# Patient Record
Sex: FEMALE | Race: WHITE | NOT HISPANIC OR LATINO | Employment: OTHER | URBAN - METROPOLITAN AREA
[De-identification: names, ages, dates, MRNs, and addresses within clinical notes are randomized per-mention and may not be internally consistent; named-entity substitution may affect disease eponyms.]

---

## 2019-10-25 ENCOUNTER — OFFICE VISIT (OUTPATIENT)
Dept: FAMILY MEDICINE | Facility: CLINIC | Age: 74
End: 2019-10-25
Payer: COMMERCIAL

## 2019-10-25 VITALS
SYSTOLIC BLOOD PRESSURE: 120 MMHG | OXYGEN SATURATION: 98 % | HEART RATE: 72 BPM | TEMPERATURE: 97.8 F | HEIGHT: 60 IN | WEIGHT: 171.7 LBS | DIASTOLIC BLOOD PRESSURE: 82 MMHG | BODY MASS INDEX: 33.71 KG/M2

## 2019-10-25 DIAGNOSIS — R44.8 SENSATION OF BOTH HEAT AND COLD: ICD-10-CM

## 2019-10-25 DIAGNOSIS — T78.40XA ALLERGIC STATE, INITIAL ENCOUNTER: Primary | ICD-10-CM

## 2019-10-25 DIAGNOSIS — I10 ESSENTIAL HYPERTENSION: ICD-10-CM

## 2019-10-25 LAB
ANION GAP SERPL CALCULATED.3IONS-SCNC: 4 MMOL/L (ref 3–14)
BUN SERPL-MCNC: 16 MG/DL (ref 7–30)
CALCIUM SERPL-MCNC: 9 MG/DL (ref 8.5–10.1)
CHLORIDE SERPL-SCNC: 104 MMOL/L (ref 94–109)
CO2 SERPL-SCNC: 28 MMOL/L (ref 20–32)
CREAT SERPL-MCNC: 0.75 MG/DL (ref 0.52–1.04)
GFR SERPL CREATININE-BSD FRML MDRD: 77 ML/MIN/{1.73_M2}
GLUCOSE SERPL-MCNC: 93 MG/DL (ref 70–99)
POTASSIUM SERPL-SCNC: 4.3 MMOL/L (ref 3.4–5.3)
SODIUM SERPL-SCNC: 136 MMOL/L (ref 133–144)
TSH SERPL DL<=0.005 MIU/L-ACNC: 2.58 MU/L (ref 0.4–4)

## 2019-10-25 PROCEDURE — 84443 ASSAY THYROID STIM HORMONE: CPT | Performed by: NURSE PRACTITIONER

## 2019-10-25 PROCEDURE — 80048 BASIC METABOLIC PNL TOTAL CA: CPT | Performed by: NURSE PRACTITIONER

## 2019-10-25 PROCEDURE — 36415 COLL VENOUS BLD VENIPUNCTURE: CPT | Performed by: NURSE PRACTITIONER

## 2019-10-25 PROCEDURE — 99204 OFFICE O/P NEW MOD 45 MIN: CPT | Performed by: NURSE PRACTITIONER

## 2019-10-25 RX ORDER — AMLODIPINE BESYLATE 5 MG/1
5 TABLET ORAL
COMMUNITY
Start: 2019-08-31 | End: 2020-06-18

## 2019-10-25 RX ORDER — ASCORBIC ACID 500 MG
1-2 TABLET ORAL EVERY 24 HOURS
COMMUNITY
Start: 2005-04-20

## 2019-10-25 RX ORDER — ALBUTEROL SULFATE 90 UG/1
1-2 AEROSOL, METERED RESPIRATORY (INHALATION) EVERY 6 HOURS PRN
Qty: 18 G | Refills: 3 | Status: SHIPPED | OUTPATIENT
Start: 2019-10-25

## 2019-10-25 RX ORDER — TRIAMCINOLONE ACETONIDE 1 MG/G
CREAM TOPICAL
COMMUNITY
Start: 2014-08-14 | End: 2019-10-25

## 2019-10-25 RX ORDER — METOPROLOL SUCCINATE 25 MG/1
TABLET, EXTENDED RELEASE ORAL
COMMUNITY
Start: 2019-08-22 | End: 2020-03-02

## 2019-10-25 RX ORDER — OMEGA-3/DHA/EPA/FISH OIL 60 MG-90MG
CAPSULE ORAL EVERY 24 HOURS
COMMUNITY
Start: 2006-10-26 | End: 2019-10-25

## 2019-10-25 RX ORDER — METRONIDAZOLE 7.5 MG/G
GEL TOPICAL
COMMUNITY
Start: 2019-10-22 | End: 2020-06-18

## 2019-10-25 SDOH — HEALTH STABILITY: MENTAL HEALTH: HOW OFTEN DO YOU HAVE A DRINK CONTAINING ALCOHOL?: NEVER

## 2019-10-25 ASSESSMENT — MIFFLIN-ST. JEOR: SCORE: 1186.36

## 2019-10-25 NOTE — PROGRESS NOTES
"Subjective     Violet Robins is a 74 year old female who presents to clinic today for the following health issues: Complains of intermittent shortness of breath and wheezing, mainly when she inside of her house and sometimes when she visiting her brother who lives in old apartment, possibly dust, mold exposure. The patient reports history of allergies. The patient also complains of fatigue and states she either feels hot or cold sometimes, worse at night time.     HPI   Wheezing       Duration: 2 years- intermittent     Description (location/character/radiation): Patient has allergies and has been wheezing, she is unsure if this is due to her allergies or is wondering if she could have asthma.     Intensity:  mild    Accompanying signs and symptoms: She is going to [place] and is concerned about her wheezing.     History (similar episodes/previous evaluation): None    Precipitating or alleviating factors: None    Therapies tried and outcome: Mucinex- has been helpful      Reviewed and updated as needed this visit by Provider  Tobacco  Allergies  Meds  Problems  Med Hx  Surg Hx  Fam Hx         Review of Systems   ROS COMP: Constitutional, HEENT, cardiovascular, pulmonary, gi and gu systems are negative, except as otherwise noted.      Objective    /82 (BP Location: Right arm, Patient Position: Sitting, Cuff Size: Adult Regular)   Pulse 72   Temp 97.8  F (36.6  C) (Tympanic)   Ht 1.518 m (4' 11.75\")   Wt 77.9 kg (171 lb 11.2 oz)   SpO2 98%   BMI 33.81 kg/m    Body mass index is 33.81 kg/m .  Physical Exam   GENERAL: healthy, alert and no distress  EYES: Eyes grossly normal to inspection, PERRL and conjunctivae and sclerae normal  RESP: lungs clear to auscultation - no rales, rhonchi or wheezes  CV: regular rate and rhythm, normal S1 S2, no S3 or S4, no murmur, click or rub, no peripheral edema and peripheral pulses strong  NEURO: Normal strength and tone, mentation intact and speech " normal  PSYCH: mentation appears normal, affect normal/bright    Diagnostic Test Results:  Labs reviewed in Epic        Assessment & Plan     1. Allergic state, initial encounter  -seasonal allergies  -will consider PFT's if no improvement   - albuterol (PROAIR HFA/PROVENTIL HFA/VENTOLIN HFA) 108 (90 Base) MCG/ACT inhaler; Inhale 1-2 puffs into the lungs every 6 hours as needed for shortness of breath / dyspnea or wheezing  Dispense: 18 g; Refill: 3    2. Essential hypertension  -well controlled   - Basic metabolic panel    3. Sensation of both heat and cold  -thyroid function normal   - TSH with free T4 reflex     See Patient Instructions    Return in about 2 weeks (around 11/8/2019), or if symptoms worsen or fail to improve.    BERTA Troncoso [facility]

## 2019-10-25 NOTE — PATIENT INSTRUCTIONS
Albuterol 1-2 puffs every 6 hrs as needed for wheezing and shortness of breath     Recommend to check thyroid function    Monitor BP at home.

## 2019-10-25 NOTE — LETTER
[facility]  [address]  [facility] [address]  Phone: [phone]      10/25/2019     Violet Robins  [address]    [place] [address]      Dear Violet:    Thank you for allowing me to participate in your care. Your recent test results were reviewed and listed below.      Thyroid function, kidney function, electrolytes normal.     BERTA Troncoso CNP     Results for orders placed or performed in visit on 10/25/19   TSH with free T4 reflex   Result Value Ref Range    TSH 2.58 0.40 - 4.00 mU/L   Basic metabolic panel   Result Value Ref Range    Sodium 136 133 - 144 mmol/L    Potassium 4.3 3.4 - 5.3 mmol/L    Chloride 104 94 - 109 mmol/L    Carbon Dioxide 28 20 - 32 mmol/L    Anion Gap 4 3 - 14 mmol/L    Glucose 93 70 - 99 mg/dL    Urea Nitrogen 16 7 - 30 mg/dL    Creatinine 0.75 0.52 - 1.04 mg/dL    GFR Estimate 77 >60 mL/min/[1.73_m2]    GFR Estimate If Black 89 >60 mL/min/[1.73_m2]    Calcium 9.0 8.5 - 10.1 mg/dL         Thank you for choosing [facility]. As a result, please continue with the treatment plan discussed in the office. Return as discussed or sooner if symptoms worsen or fail to improve.     If you have any further questions or concerns, please do not hesitate to contact us.      Sincerely,  Komal Lynch

## 2020-03-02 ENCOUNTER — OFFICE VISIT (OUTPATIENT)
Dept: FAMILY MEDICINE | Facility: CLINIC | Age: 75
End: 2020-03-02
Payer: COMMERCIAL

## 2020-03-02 VITALS
DIASTOLIC BLOOD PRESSURE: 90 MMHG | RESPIRATION RATE: 18 BRPM | WEIGHT: 171 LBS | BODY MASS INDEX: 33.57 KG/M2 | HEART RATE: 75 BPM | TEMPERATURE: 98 F | OXYGEN SATURATION: 98 % | HEIGHT: 60 IN | SYSTOLIC BLOOD PRESSURE: 150 MMHG

## 2020-03-02 DIAGNOSIS — R06.2 WHEEZING: Primary | ICD-10-CM

## 2020-03-02 DIAGNOSIS — J45.909 ALLERGIC ASTHMA WITH STATED CAUSE: ICD-10-CM

## 2020-03-02 DIAGNOSIS — I10 BENIGN ESSENTIAL HYPERTENSION: ICD-10-CM

## 2020-03-02 PROCEDURE — 99214 OFFICE O/P EST MOD 30 MIN: CPT | Performed by: NURSE PRACTITIONER

## 2020-03-02 RX ORDER — LOSARTAN POTASSIUM 50 MG/1
25 TABLET ORAL DAILY
Qty: 30 TABLET | Refills: 1 | Status: SHIPPED | OUTPATIENT
Start: 2020-03-02 | End: 2020-03-02

## 2020-03-02 RX ORDER — LOSARTAN POTASSIUM 25 MG/1
25 TABLET ORAL DAILY
Qty: 30 TABLET | Refills: 1 | Status: SHIPPED | OUTPATIENT
Start: 2020-03-02 | End: 2020-06-18

## 2020-03-02 RX ORDER — MONTELUKAST SODIUM 10 MG/1
10 TABLET ORAL AT BEDTIME
Qty: 30 TABLET | Refills: 3 | Status: SHIPPED | OUTPATIENT
Start: 2020-03-02 | End: 2020-06-18

## 2020-03-02 ASSESSMENT — MIFFLIN-ST. JEOR: SCORE: 1183.18

## 2020-03-02 NOTE — PROGRESS NOTES
"Subjective     Violet Robins is a 74 year old female who presents to clinic today for the following health issues: elevated BP and wheezing. Denies shortness of breath, chest pain, palpitations, states sometimes when she takes Metoprolol it makes her feel dizzy. The patient reports she used to take Lisinopril for hypertension and it worked very well, but unfortunately she had to stop it due to side effects: dry cough. Her prior provider changed Lisinopril to Metoprolol, stated at 25 mg daily and then increase to 50 mg daily, but patient states blood pressure still was high. She is currently taking 50 mg of Metoprolol daily and 5 mg of Norvasc daily.   The patient reports she gets wheezy sometimes when exposed to certain triggers: cold air, dust, mold, dry air. The patient states wheezing is rare, she is using Albuterol as needed with good relief.     HPI   Wheezing       Duration: off and on for awhile, started to get worse last week     Description  Wheezing, congestion at night time, cough in the morning     Severity: mild    Accompanying signs and symptoms: Was seen on 10/25/19 for similar symptoms.     History (predisposing factors):  none    Precipitating or alleviating factors: None    Therapies tried and outcome:  Albuterol inhaler, humidifier       Reviewed and updated as needed this visit by Provider         Review of Systems   ROS COMP: Constitutional, HEENT, cardiovascular, pulmonary, gi and gu systems are negative, except as otherwise noted.      Objective    BP (!) 150/90 (BP Location: Right arm, Patient Position: Sitting)   Pulse 75   Temp 98  F (36.7  C) (Tympanic)   Resp 18   Ht 1.518 m (4' 11.75\")   Wt 77.6 kg (171 lb)   SpO2 98%   BMI 33.68 kg/m    Body mass index is 33.68 kg/m .  Physical Exam   GENERAL: healthy, alert and no distress  RESP: lungs clear to auscultation - no rales, rhonchi or wheezes  CV: regular rate and rhythm, normal S1 S2, no S3 or S4, no murmur, click or rub, no " peripheral edema and peripheral pulses strong  NEURO: Normal strength and tone, mentation intact and speech normal  PSYCH: mentation appears normal, affect normal/bright    Diagnostic Test Results:  Labs reviewed in Epic        Assessment & Plan     1. Wheezing  -due to allergies   -continue Albuterol as needed  -started Singulair  - montelukast (SINGULAIR) 10 MG tablet; Take 1 tablet (10 mg) by mouth At Bedtime  Dispense: 30 tablet; Refill: 3    2. Allergic asthma with stated cause    - montelukast (SINGULAIR) 10 MG tablet; Take 1 tablet (10 mg) by mouth At Bedtime  Dispense: 30 tablet; Refill: 3    3. Benign essential hypertension  -advised patient that Metoprolol not an ideal medication to treat hypertension, unless if patient has other cardiovascular problems, since it is also causing side effects (lightheadedness) recommended her to taper off Metoprolol and start Cozaar and continue Norvasc 5 mg daily  -follow up with RN in 2 weeks for BP recheck.   - losartan (COZAAR) 50 MG tablet; Take 0.5 tablets (25 mg) by mouth daily  Dispense: 30 tablet; Refill: 1         See Patient Instructions    Return in about 2 weeks (around 3/16/2020) for BP Recheck.    BERTA Troncoso [facility]

## 2020-03-02 NOTE — PATIENT INSTRUCTIONS
Continue Norvasc 5 mg daily     Will taper off Metoprolol, this is weak medication to treat just high blood pressure, not first choice medication for treatment of hypertension    Reduce to 25 mg daily for 1 week and then stop    Start Cozaar 25 mg daily     For allergies start Singulair 10 mg daily    Continue Albuterol as needed      Schedule nurse appointment in 2 weeks to recheck BP

## 2020-03-09 PROBLEM — J45.909: Status: ACTIVE | Noted: 2020-03-09

## 2020-03-16 ENCOUNTER — TELEPHONE (OUTPATIENT)
Dept: FAMILY MEDICINE | Facility: CLINIC | Age: 75
End: 2020-03-16

## 2020-03-16 ENCOUNTER — ALLIED HEALTH/NURSE VISIT (OUTPATIENT)
Dept: FAMILY MEDICINE | Facility: CLINIC | Age: 75
End: 2020-03-16
Payer: COMMERCIAL

## 2020-03-16 VITALS — DIASTOLIC BLOOD PRESSURE: 82 MMHG | SYSTOLIC BLOOD PRESSURE: 126 MMHG | HEART RATE: 72 BPM

## 2020-03-16 DIAGNOSIS — I10 ESSENTIAL HYPERTENSION: Primary | ICD-10-CM

## 2020-03-16 PROCEDURE — 99207 ZZC NO CHARGE NURSE ONLY: CPT

## 2020-03-16 NOTE — NURSING NOTE
Violet Robins is a 74 year old year old patient who comes in today for a Blood Pressure check because of new medication and ongoing blood pressure monitoring.    Pt was seen 3/2/20 by Komal Lynch and Cozaar, 25 mg, was added to regimen.    Vital Signs as repeated by RN:   /82, pulse of 72.    Patient is taking medication as prescribed  Patient is tolerating medications well.  Patient is monitoring Blood Pressure at home.  Average readings are 120's/80's  Current complaints: none  Disposition:  Routed to provider in a telephone note.  Pt is now at goal.  SALINA Dye RN

## 2020-05-21 ENCOUNTER — TELEPHONE (OUTPATIENT)
Dept: FAMILY MEDICINE | Facility: CLINIC | Age: 75
End: 2020-05-21

## 2020-05-21 RX ORDER — METRONIDAZOLE 7.5 MG/G
GEL TOPICAL
OUTPATIENT
Start: 2020-05-21

## 2020-05-21 NOTE — TELEPHONE ENCOUNTER
"Requested Prescriptions   Pending Prescriptions Disp Refills     metroNIDAZOLE (METROGEL) 0.75 % external gel         Topical Acne Medications Protocol Passed - 5/21/2020  1:41 PM        Passed - Patient is 12 years of age or older        Passed - Recent (12 mo) or future (30 days) visit within the authorizing provider's specialty     Patient has had an office visit with the authorizing provider or a provider within the authorizing providers department within the previous 12 mos or has a future within next 30 days. See \"Patient Info\" tab in inbasket, or \"Choose Columns\" in Meds & Orders section of the refill encounter.              Passed - Medication is active on med list           Routing refill request to provider for review/approval because:  Medication is reported/historical        "

## 2020-06-18 ENCOUNTER — OFFICE VISIT (OUTPATIENT)
Dept: FAMILY MEDICINE | Facility: CLINIC | Age: 75
End: 2020-06-18
Payer: COMMERCIAL

## 2020-06-18 VITALS
DIASTOLIC BLOOD PRESSURE: 80 MMHG | SYSTOLIC BLOOD PRESSURE: 130 MMHG | OXYGEN SATURATION: 95 % | HEIGHT: 60 IN | BODY MASS INDEX: 32.61 KG/M2 | WEIGHT: 166.1 LBS | HEART RATE: 80 BPM | RESPIRATION RATE: 16 BRPM | TEMPERATURE: 97.8 F

## 2020-06-18 DIAGNOSIS — L71.9 ROSACEA: ICD-10-CM

## 2020-06-18 DIAGNOSIS — I10 BENIGN ESSENTIAL HYPERTENSION: Primary | ICD-10-CM

## 2020-06-18 DIAGNOSIS — Z85.3 PERSONAL HISTORY OF MALIGNANT NEOPLASM OF BREAST: ICD-10-CM

## 2020-06-18 DIAGNOSIS — J45.20 MILD INTERMITTENT EXTRINSIC ASTHMA WITHOUT COMPLICATION: ICD-10-CM

## 2020-06-18 DIAGNOSIS — Z12.31 ENCOUNTER FOR SCREENING MAMMOGRAM FOR BREAST CANCER: ICD-10-CM

## 2020-06-18 PROCEDURE — 99214 OFFICE O/P EST MOD 30 MIN: CPT | Performed by: NURSE PRACTITIONER

## 2020-06-18 RX ORDER — METRONIDAZOLE 7.5 MG/G
GEL TOPICAL 2 TIMES DAILY
Qty: 45 G | Refills: 2 | Status: SHIPPED | OUTPATIENT
Start: 2020-06-18 | End: 2020-11-12

## 2020-06-18 RX ORDER — LOSARTAN POTASSIUM 25 MG/1
25 TABLET ORAL DAILY
Qty: 90 TABLET | Refills: 3 | Status: SHIPPED | OUTPATIENT
Start: 2020-06-18 | End: 2021-06-04

## 2020-06-18 RX ORDER — AMLODIPINE BESYLATE 5 MG/1
5 TABLET ORAL DAILY
Qty: 90 TABLET | Refills: 3 | Status: SHIPPED | OUTPATIENT
Start: 2020-06-18 | End: 2021-08-19

## 2020-06-18 RX ORDER — MONTELUKAST SODIUM 10 MG/1
10 TABLET ORAL AT BEDTIME
Qty: 90 TABLET | Refills: 3 | Status: SHIPPED | OUTPATIENT
Start: 2020-06-18 | End: 2021-06-23

## 2020-06-18 ASSESSMENT — MIFFLIN-ST. JEOR: SCORE: 1155.95

## 2020-06-18 NOTE — PROGRESS NOTES
"Subjective     Violet Robins is a 75 year old female who presents to clinic today for the following health issues:    Chief Complaint   Patient presents with     Refill Request     Losartan, Singulair, Amlodipine, Metrogel for Roscasea        HPI   Hypertension Follow-up      Do you check your blood pressure regularly outside of the clinic? Yes at home, results are 124/78     Are you following a low salt diet? No    Are your blood pressures ever more than 140 on the top number (systolic) OR more   than 90 on the bottom number (diastolic), for example 140/90? No  Asthma Follow-Up    Was ACT completed today?    Yes    ACT Total Scores 6/18/2020   ACT TOTAL SCORE (Goal Greater than or Equal to 20) 25   In the past 12 months, how many times did you visit the emergency room for your asthma without being admitted to the hospital? 0   In the past 12 months, how many times were you hospitalized overnight because of your asthma? 0         How many days per week do you miss taking your asthma controller medication?  I do not have an asthma controller medication    Please describe any recent triggers for your asthma: pollens    Have you had any Emergency Room Visits, Urgent Care Visits, or Hospital Admissions since your last office visit?  No        Reviewed and updated as needed this visit by Provider         Review of Systems   Constitutional, HEENT, cardiovascular, pulmonary, gi and gu systems are negative, except as otherwise noted.      Objective    /80 (BP Location: Right arm, Patient Position: Sitting, Cuff Size: Adult Regular)   Pulse 80   Temp 97.8  F (36.6  C) (Tympanic)   Resp 16   Ht 1.518 m (4' 11.75\")   Wt 75.3 kg (166 lb 1.6 oz)   SpO2 95%   BMI 32.71 kg/m    Body mass index is 32.71 kg/m .  Physical Exam   GENERAL: healthy, alert and no distress  EYES: Eyes grossly normal to inspection, PERRL and conjunctivae and sclerae normal  RESP: lungs clear to auscultation - no rales, rhonchi or " wheezes  CV: regular rate and rhythm, normal S1 S2, no S3 or S4, no murmur, click or rub, no peripheral edema and peripheral pulses strong  SKIN: no suspicious lesions or rashes  PSYCH: mentation appears normal, affect normal/bright    Diagnostic Test Results:  Labs reviewed in Epic        Assessment & Plan     1. Benign essential hypertension  -well controlled   - amLODIPine (NORVASC) 5 MG tablet; Take 1 tablet (5 mg) by mouth daily  Dispense: 90 tablet; Refill: 3  - losartan (COZAAR) 25 MG tablet; Take 1 tablet (25 mg) by mouth daily  Dispense: 90 tablet; Refill: 3    2. Mild intermittent extrinsic asthma without complication  -well controlled   - montelukast (SINGULAIR) 10 MG tablet; Take 1 tablet (10 mg) by mouth At Bedtime  Dispense: 90 tablet; Refill: 3    3. Rosacea    - metroNIDAZOLE (METROGEL) 0.75 % external gel; Apply topically 2 times daily  Dispense: 45 g; Refill: 2    4. Personal history of malignant neoplasm of breast  -history of breast cancer,  -due for screening mammogram    5. Encounter for screening mammogram for breast cancer  -would like to have 3 D mammogram   - MA Screen Bilateral w/Vlad; Future     See Patient Instructions    Return in about 1 year (around 6/18/2021) for Routine Visit.    BERTA Troncoso [facility]

## 2020-06-19 ENCOUNTER — HOSPITAL ENCOUNTER (OUTPATIENT)
Dept: MAMMOGRAPHY | Facility: CLINIC | Age: 75
Discharge: HOME | End: 2020-06-19
Attending: NURSE PRACTITIONER | Admitting: NURSE PRACTITIONER
Payer: MEDICARE

## 2020-06-19 DIAGNOSIS — Z12.31 ENCOUNTER FOR SCREENING MAMMOGRAM FOR BREAST CANCER: ICD-10-CM

## 2020-06-19 PROCEDURE — 77067 SCR MAMMO BI INCL CAD: CPT

## 2020-06-19 ASSESSMENT — ASTHMA QUESTIONNAIRES: ACT_TOTALSCORE: 25

## 2020-11-12 DIAGNOSIS — L71.9 ROSACEA: ICD-10-CM

## 2020-11-12 RX ORDER — METRONIDAZOLE 7.5 MG/G
GEL TOPICAL
Qty: 45 G | Refills: 1 | Status: SHIPPED | OUTPATIENT
Start: 2020-11-12 | End: 2021-03-16

## 2020-11-12 NOTE — TELEPHONE ENCOUNTER
"Requested Prescriptions   Pending Prescriptions Disp Refills     metroNIDAZOLE (METROGEL) 0.75 % external gel [Pharmacy Med Name: metroNIDAZOLE External Gel 0.75 %] 45 g 0     Sig: APPLY TO AFFECTED AREAS TWICE DAILY       Topical Acne Medications Protocol Passed - 11/12/2020  1:47 PM        Passed - Patient is 12 years of age or older        Passed - Recent (12 mo) or future (30 days) visit within the authorizing provider's specialty     Patient has had an office visit with the authorizing provider or a provider within the authorizing providers department within the previous 12 mos or has a future within next 30 days. See \"Patient Info\" tab in inbasket, or \"Choose Columns\" in Meds & Orders section of the refill encounter.              Passed - Medication is active on med list             "

## 2021-03-15 DIAGNOSIS — L71.9 ROSACEA: ICD-10-CM

## 2021-03-15 NOTE — TELEPHONE ENCOUNTER
"Requested Prescriptions   Pending Prescriptions Disp Refills     metroNIDAZOLE (METROGEL) 0.75 % external gel [Pharmacy Med Name: metroNIDAZOLE External Gel 0.75 %] 45 g 0     Sig: APPLY TO AFFECTED AREA(s) TWICE A DAY       Topical Acne Medications Protocol Passed - 3/15/2021 10:36 AM        Passed - Patient is 12 years of age or older        Passed - Recent (12 mo) or future (30 days) visit within the authorizing provider's specialty     Patient has had an office visit with the authorizing provider or a provider within the authorizing providers department within the previous 12 mos or has a future within next 30 days. See \"Patient Info\" tab in inbasket, or \"Choose Columns\" in Meds & Orders section of the refill encounter.              Passed - Medication is active on med list             "

## 2021-03-16 RX ORDER — METRONIDAZOLE 7.5 MG/G
GEL TOPICAL
Qty: 45 G | Refills: 0 | Status: SHIPPED | OUTPATIENT
Start: 2021-03-16 | End: 2021-05-06

## 2021-05-04 DIAGNOSIS — L71.9 ROSACEA: ICD-10-CM

## 2021-05-06 RX ORDER — METRONIDAZOLE 7.5 MG/G
GEL TOPICAL
Qty: 45 G | Refills: 0 | Status: SHIPPED | OUTPATIENT
Start: 2021-05-06 | End: 2021-07-15

## 2021-06-04 DIAGNOSIS — I10 BENIGN ESSENTIAL HYPERTENSION: ICD-10-CM

## 2021-06-04 RX ORDER — LOSARTAN POTASSIUM 25 MG/1
TABLET ORAL
Qty: 90 TABLET | Refills: 0 | Status: SHIPPED | OUTPATIENT
Start: 2021-06-04 | End: 2021-06-23

## 2021-06-04 NOTE — TELEPHONE ENCOUNTER
Creatinine   Date Value Ref Range Status   10/25/2019 0.75 0.52 - 1.04 mg/dL Final     Potassium   Date Value Ref Range Status   10/25/2019 4.3 3.4 - 5.3 mmol/L Final

## 2021-06-21 DIAGNOSIS — J45.20 MILD INTERMITTENT EXTRINSIC ASTHMA WITHOUT COMPLICATION: ICD-10-CM

## 2021-06-21 DIAGNOSIS — I10 BENIGN ESSENTIAL HYPERTENSION: ICD-10-CM

## 2021-06-23 RX ORDER — LOSARTAN POTASSIUM 25 MG/1
TABLET ORAL
Qty: 90 TABLET | Refills: 0 | Status: SHIPPED | OUTPATIENT
Start: 2021-06-23 | End: 2021-08-19

## 2021-06-23 RX ORDER — MONTELUKAST SODIUM 10 MG/1
TABLET ORAL
Qty: 90 TABLET | Refills: 0 | Status: SHIPPED | OUTPATIENT
Start: 2021-06-23 | End: 2021-09-17

## 2021-06-23 NOTE — TELEPHONE ENCOUNTER
"Requested Prescriptions   Pending Prescriptions Disp Refills     montelukast (SINGULAIR) 10 MG tablet [Pharmacy Med Name: Montelukast Sodium Oral Tablet 10 MG] 90 tablet 0     Sig: TAKE ONE TABLET BY MOUTH AT BEDTIME       Leukotriene Inhibitors Protocol Failed - 6/21/2021  9:10 AM        Failed - Asthma control assessment score within normal limits in last 6 months     Please review ACT score.           Failed - Recent (6 mo) or future (30 days) visit within the authorizing provider's specialty     Patient had office visit in the last 6 months or has a visit in the next 30 days with authorizing provider or within the authorizing provider's specialty.  See \"Patient Info\" tab in inbasket, or \"Choose Columns\" in Meds & Orders section of the refill encounter.            Passed - Patient is age 12 or older     If patient is under 16, ok to refill using age based dosing.           Passed - Medication is active on med list           losartan (COZAAR) 25 MG tablet [Pharmacy Med Name: Losartan Potassium Oral Tablet 25 MG] 90 tablet 0     Sig: TAKE ONE TABLET BY MOUTH ONE TIME DAILY       Angiotensin-II Receptors Failed - 6/21/2021  9:10 AM        Failed - Last blood pressure under 140/90 in past 12 months     BP Readings from Last 3 Encounters:   06/18/20 130/80   03/16/20 126/82   03/02/20 (!) 150/90                 Failed - Recent (12 mo) or future (30 days) visit within the authorizing provider's specialty     Patient has had an office visit with the authorizing provider or a provider within the authorizing providers department within the previous 12 mos or has a future within next 30 days. See \"Patient Info\" tab in inbasket, or \"Choose Columns\" in Meds & Orders section of the refill encounter.              Failed - Normal serum creatinine on file in past 12 months     Recent Labs   Lab Test 10/25/19  1001   CR 0.75       Ok to refill medication if creatinine is low          Failed - Normal serum potassium on file in " past 12 months     Recent Labs   Lab Test 10/25/19  1001   POTASSIUM 4.3                    Passed - Medication is active on med list        Passed - Patient is age 18 or older        Passed - No active pregnancy on record        Passed - No positive pregnancy test in past 12 months

## 2021-07-10 DIAGNOSIS — L71.9 ROSACEA: ICD-10-CM

## 2021-07-15 RX ORDER — METRONIDAZOLE 7.5 MG/G
GEL TOPICAL
Qty: 45 G | Refills: 0 | Status: SHIPPED | OUTPATIENT
Start: 2021-07-15 | End: 2021-09-15

## 2021-07-19 ENCOUNTER — HOSPITAL ENCOUNTER (EMERGENCY)
Facility: CLINIC | Age: 76
Discharge: HOME | End: 2021-07-19
Attending: FAMILY MEDICINE | Admitting: FAMILY MEDICINE
Payer: COMMERCIAL

## 2021-07-19 VITALS
RESPIRATION RATE: 16 BRPM | HEART RATE: 71 BPM | WEIGHT: 170 LBS | BODY MASS INDEX: 33.48 KG/M2 | SYSTOLIC BLOOD PRESSURE: 148 MMHG | OXYGEN SATURATION: 97 % | TEMPERATURE: 98 F | DIASTOLIC BLOOD PRESSURE: 79 MMHG

## 2021-07-19 DIAGNOSIS — L03.113 CELLULITIS OF RIGHT UPPER EXTREMITY: ICD-10-CM

## 2021-07-19 DIAGNOSIS — T78.40XA ALLERGIC REACTION, INITIAL ENCOUNTER: ICD-10-CM

## 2021-07-19 PROCEDURE — 99282 EMERGENCY DEPT VISIT SF MDM: CPT | Performed by: FAMILY MEDICINE

## 2021-07-19 PROCEDURE — 99284 EMERGENCY DEPT VISIT MOD MDM: CPT | Performed by: FAMILY MEDICINE

## 2021-07-19 RX ORDER — CEPHALEXIN 500 MG/1
500 CAPSULE ORAL 4 TIMES DAILY
Qty: 40 CAPSULE | Refills: 0 | Status: SHIPPED | OUTPATIENT
Start: 2021-07-19 | End: 2021-07-29

## 2021-07-19 RX ORDER — PREDNISONE 20 MG/1
TABLET ORAL
Qty: 10 TABLET | Refills: 0 | Status: SHIPPED | OUTPATIENT
Start: 2021-07-19 | End: 2021-12-15

## 2021-07-19 NOTE — ED TRIAGE NOTES
pt bite by unknown insect or bee yesterday. Pt's right arm is red up into forearm. Hx of cellulitis from a bee sting with similar symptoms.

## 2021-07-19 NOTE — DISCHARGE INSTRUCTIONS
Keflex 500 mg p.o. 4 times daily x10 days.  Prednisone 40 mg p.o. daily x5 days.  Close eye on the extent of the redness and if it is exceeding the margins marked today in the emergency department by more than 1 cm over the next 48-72 hours please return.

## 2021-07-19 NOTE — ED PROVIDER NOTES
History     Chief Complaint   Patient presents with     Insect Bite     pt bite by unknown insect or bee yesterday. Pt's right arm is red up into forearm. Hx of cellulitis from a bee sting with similar symptoms.      HPI  Violet Robins is a 76 year old female, past medical history is significant for asthma, hypertension, breast cancer, hypertension, resents to the emergency department with concerns of an insect sting to her right thumb area yesterday morning or early afternoon.  Since that time the area has become increasingly red and she is now getting red streaks up her forearm.  The patient states that about 5 or 6 years ago she had a similar reaction that ultimately wound up requiring IV antibiotics and she does not want that to happen again.  She has used some topical Benadryl cream which has not helped very much.  No other medications and denied any oral Benadryl.  She notes no difficulty swallowing or breathing, notes no disseminated rash of any kind no swelling of digits other than the one affected by the sting.      Allergies:  Allergies   Allergen Reactions     Bee Venom Hives and Swelling     Oxycontin [Kdc:Ci Pigment Blue 63+Oxycodone] Nausea and Vomiting     Codeine      PN: LW Reaction: nausea and vomiting     Latex Other (See Comments)     PN: Converted from LW Latex Sensitivity Flag     Lisinopril      cough     Tramadol      PN: LW Reaction: nausea and vomiting       Problem List:    Patient Active Problem List    Diagnosis Date Noted     Allergic asthma with stated cause 03/09/2020     Priority: Medium     CARDIOVASCULAR SCREENING; LDL GOAL LESS THAN 130 01/16/2014     Priority: Medium     DDD (degenerative disc disease), cervical 01/08/2014     Priority: Medium     Routine general medical examination at a health care facility 01/08/2014     Priority: Medium     [facility]       [place] in situ breast 08/17/2011     Priority: Medium     Essential hypertension 06/07/2003     Priority: Medium      lisinopril 10 mg daily  ; Hypertension     Last Assessment & Plan:   Patient has hypertension currently stable on lisinopril 10 mg. Blood pressure today is 136/82.   No side effects noted from medications.  She denies chest pain or shortness of breath.  Patient denies headaches or neurological symptoms. She is not experiencing edema, orthopnea, or dyspnea on exertion.          Past Medical History:    No past medical history on file.    Past Surgical History:    No past surgical history on file.    Family History:    Family History   Problem Relation Age of Onset     Coronary Artery Disease Father        Social History:  Marital Status:   [2]  Social History     Tobacco Use     Smoking status: Never Smoker     Smokeless tobacco: Never Used   Substance Use Topics     Alcohol use: Not Currently     Drug use: Never        Medications:    cephALEXin (KEFLEX) 500 MG capsule  predniSONE (DELTASONE) 20 MG tablet  albuterol (PROAIR HFA/PROVENTIL HFA/VENTOLIN HFA) 108 (90 Base) MCG/ACT inhaler  amLODIPine (NORVASC) 5 MG tablet  ascorbic acid 500 MG TABS  aspirin (ASA) 81 MG tablet  Calcium Carbonate-Vitamin D (CALCIUM + D PO)  Cholecalciferol (VITAMIN D) 1000 UNITS capsule  FISH OIL-KRILL OIL PO  Glucosamine-Chondroit-Vit C-Mn (GLUCOSAMINE 1500 COMPLEX) CAPS  losartan (COZAAR) 25 MG tablet  metroNIDAZOLE (METROGEL) 0.75 % external gel  montelukast (SINGULAIR) 10 MG tablet  Multiple Vitamin (MULTIVITAMIN OR)  VITAMIN E          Review of Systems   All other systems reviewed and are negative.      Physical Exam   BP: (!) 148/79  Pulse: 71  Temp: 98  F (36.7  C)  Resp: 18  Weight: 77.1 kg (170 lb)  SpO2: 97 %      Physical Exam  Vitals and nursing note reviewed.   Constitutional:       Appearance: Normal appearance.   Musculoskeletal:      Comments: There is erythema, warmth, subtle swelling involving the right hand first digit dorsally which extends into the distal one third forearm.  There is no epitrochlear  lymphadenopathy.   Skin:     General: Skin is warm and dry.      Capillary Refill: Capillary refill takes less than 2 seconds.      Findings: Rash present.   Neurological:      Mental Status: She is alert.         ED Course        Procedures          3:42 PM  Margins are marked with the cellulitis by the patient's nurse before she is dispositioned to home today with oral Keflex and oral prednisone.  Reviewed expectations, if the area of cellulitis exceeds the currently marked margin by more than 1 cm over the next 48 to 72 hours she is to return to the emergency department for repeat evaluation.    Critical Care time:  none               No results found for this or any previous visit (from the past 24 hour(s)).    Medications - No data to display    Assessments & Plan (with Medical Decision Making)   Assessments and plan with medical decision making at the time stamp above.    Disclaimer: This note consists of symbols derived from keyboarding, dictation and/or voice recognition software. As a result, there may be errors in the script that have gone undetected. Please consider this when interpreting information found in this chart.      I have reviewed the nursing notes.    I have reviewed the findings, diagnosis, plan and need for follow up with the patient.          New Prescriptions    CEPHALEXIN (KEFLEX) 500 MG CAPSULE    Take 1 capsule (500 mg) by mouth 4 times daily for 10 days    PREDNISONE (DELTASONE) 20 MG TABLET    Take two tablets (= 40mg) each day for 5 (five) days       Final diagnoses:   Cellulitis of right upper extremity   Allergic reaction, initial encounter       7/19/2021   [facility] EMERGENCY DEPT     Moiz Rivera MD  07/19/21 [id number]

## 2021-07-19 NOTE — ED NOTES
Patient states she was bit on her right hand by what she thinks was a bee or wasp at 9 am yesterday morning. The hand is swollen and the redness is traveling up the arm and down the hand. Patient states the area is very itchy and the arm is a little sore. Patient states she used benadryl cream for the itching with no relief. Patient states she had a reaction to a bee sting once before around 5-6 years ago. The doctor at that time diagnosed her with cellulitis.

## 2021-08-09 ENCOUNTER — HOSPITAL ENCOUNTER (OUTPATIENT)
Dept: MAMMOGRAPHY | Facility: CLINIC | Age: 76
Discharge: HOME | End: 2021-08-09
Attending: NURSE PRACTITIONER | Admitting: NURSE PRACTITIONER
Payer: MEDICARE

## 2021-08-09 DIAGNOSIS — Z12.31 VISIT FOR SCREENING MAMMOGRAM: ICD-10-CM

## 2021-08-09 PROCEDURE — 77063 BREAST TOMOSYNTHESIS BI: CPT

## 2021-08-16 DIAGNOSIS — I10 BENIGN ESSENTIAL HYPERTENSION: ICD-10-CM

## 2021-08-18 NOTE — TELEPHONE ENCOUNTER
"Requested Prescriptions   Pending Prescriptions Disp Refills     amLODIPine (NORVASC) 5 MG tablet [Pharmacy Med Name: amLODIPine Besylate Oral Tablet 5 MG] 90 tablet 0     Sig: TAKE ONE TABLET BY MOUTH ONE TIME DAILY       Calcium Channel Blockers Protocol  Failed - 8/16/2021  9:01 AM        Failed - Blood pressure under 140/90 in past 12 months     BP Readings from Last 3 Encounters:   07/19/21 (!) 148/79   06/18/20 130/80   03/16/20 126/82                 Failed - Recent (12 mo) or future (30 days) visit within the authorizing provider's specialty     Patient has had an office visit with the authorizing provider or a provider within the authorizing providers department within the previous 12 mos or has a future within next 30 days. See \"Patient Info\" tab in inbasket, or \"Choose Columns\" in Meds & Orders section of the refill encounter.              Failed - Normal serum creatinine on file in past 12 months     Recent Labs   Lab Test 10/25/19  1001   CR 0.75       Ok to refill medication if creatinine is low          Passed - Medication is active on med list        Passed - Patient is age 18 or older        Passed - No active pregnancy on record        Passed - No positive pregnancy test in past 12 months           losartan (COZAAR) 25 MG tablet [Pharmacy Med Name: Losartan Potassium Oral Tablet 25 MG] 90 tablet 0     Sig: TAKE ONE TABLET BY MOUTH ONE TIME DAILY       Angiotensin-II Receptors Failed - 8/16/2021  9:01 AM        Failed - Last blood pressure under 140/90 in past 12 months     BP Readings from Last 3 Encounters:   07/19/21 (!) 148/79   06/18/20 130/80   03/16/20 126/82                 Failed - Recent (12 mo) or future (30 days) visit within the authorizing provider's specialty     Patient has had an office visit with the authorizing provider or a provider within the authorizing providers department within the previous 12 mos or has a future within next 30 days. See \"Patient Info\" tab in inbasket, or " "\"Choose Columns\" in Meds & Orders section of the refill encounter.              Failed - Normal serum creatinine on file in past 12 months     Recent Labs   Lab Test 10/25/19  1001   CR 0.75       Ok to refill medication if creatinine is low          Failed - Normal serum potassium on file in past 12 months     Recent Labs   Lab Test 10/25/19  1001   POTASSIUM 4.3                    Passed - Medication is active on med list        Passed - Patient is age 18 or older        Passed - No active pregnancy on record        Passed - No positive pregnancy test in past 12 months             "

## 2021-08-19 RX ORDER — LOSARTAN POTASSIUM 25 MG/1
25 TABLET ORAL DAILY
Qty: 90 TABLET | Refills: 0 | Status: SHIPPED | OUTPATIENT
Start: 2021-08-19 | End: 2021-12-15

## 2021-08-19 RX ORDER — AMLODIPINE BESYLATE 5 MG/1
TABLET ORAL
Qty: 90 TABLET | Refills: 0 | Status: SHIPPED | OUTPATIENT
Start: 2021-08-19 | End: 2021-10-28

## 2021-09-02 DIAGNOSIS — L71.9 ROSACEA: ICD-10-CM

## 2021-09-02 RX ORDER — METRONIDAZOLE 7.5 MG/G
GEL TOPICAL
Qty: 45 G | Refills: 0 | OUTPATIENT
Start: 2021-09-02

## 2021-09-02 NOTE — LETTER
[facility]  [address] [facility] [place] [address]  [phone]        September 2, 2021  Violet Robins  [address] [facility] [place]    [facility] [address]    Dear Alayna,    I care about your health and have reviewed your health plan. I have reviewed your medical conditions, medication list, and lab results and am making recommendations based on this review, to better manage your health.    You are in particular need of attention regarding:  -Wellness (Physical) Visit     I am recommending that you:  -schedule a WELLNESS (Physical) APPOINTMENT with me.   I will check fasting labs the same day - nothing to eat except water and meds for 8-10 hours prior.    Please call us at [phone] (or use [employer]) to address the above recommendations.     Thank you for trusting [facility] and we appreciate the opportunity to serve you.  We look forward to supporting your healthcare needs in the future.    Healthy Regards,        Komal Lynch, APRN CRISTHIAN/Nisa GARCIA RN, BSN

## 2021-09-02 NOTE — TELEPHONE ENCOUNTER
Routing refill request to provider for review/approval because:  Pt was last seen on 6/18/20.  Metrogel was last ordered on 7/15/21 for 45g + 0 refills. Rosacea.  Allan.  Sheila

## 2021-09-10 DIAGNOSIS — L71.9 ROSACEA: ICD-10-CM

## 2021-09-15 RX ORDER — METRONIDAZOLE 7.5 MG/G
GEL TOPICAL
Qty: 45 G | Refills: 0 | Status: SHIPPED | OUTPATIENT
Start: 2021-09-15 | End: 2021-12-15

## 2021-09-15 NOTE — TELEPHONE ENCOUNTER
"Requested Prescriptions   Pending Prescriptions Disp Refills     metroNIDAZOLE (METROGEL) 0.75 % external gel [Pharmacy Med Name: metroNIDAZOLE External Gel 0.75 %] 45 g 0     Sig: APPLY TO AFFECTED AREA(S) TWICE DAILY       Topical Acne Medications Protocol Failed - 9/10/2021  2:43 PM        Failed - Recent (12 mo) or future (30 days) visit within the authorizing provider's specialty     Patient has had an office visit with the authorizing provider or a provider within the authorizing providers department within the previous 12 mos or has a future within next 30 days. See \"Patient Info\" tab in inbasket, or \"Choose Columns\" in Meds & Orders section of the refill encounter.              Passed - Patient is 12 years of age or older        Passed - Medication is active on med list             "

## 2021-09-16 DIAGNOSIS — J45.20 MILD INTERMITTENT EXTRINSIC ASTHMA WITHOUT COMPLICATION: ICD-10-CM

## 2021-09-17 RX ORDER — MONTELUKAST SODIUM 10 MG/1
10 TABLET ORAL AT BEDTIME
Qty: 90 TABLET | Refills: 1 | Status: SHIPPED | OUTPATIENT
Start: 2021-09-17 | End: 2021-12-15

## 2021-10-27 DIAGNOSIS — I10 BENIGN ESSENTIAL HYPERTENSION: ICD-10-CM

## 2021-10-28 RX ORDER — AMLODIPINE BESYLATE 5 MG/1
5 TABLET ORAL DAILY
Qty: 90 TABLET | Refills: 0 | Status: SHIPPED | OUTPATIENT
Start: 2021-10-28 | End: 2021-12-15

## 2021-10-28 NOTE — TELEPHONE ENCOUNTER
Pending Prescriptions:                       Disp   Refills    amLODIPine (NORVASC) 5 MG tablet [Pharmac*90 tab*0            Sig: TAKE ONE TABLET BY MOUTH ONE TIME DAILY     Routing refill request to provider for review/approval because:  Labs out of range:    BP Readings from Last 3 Encounters:   07/19/21 (!) 148/79   06/18/20 130/80   03/16/20 126/82     Creatinine   Date Value Ref Range Status   10/25/2019 0.75 0.52 - 1.04 mg/dL Final       Patient needs to be seen because it has been more than 1 year since last office visit.      Augusto Wynn RN

## 2021-11-24 ENCOUNTER — NURSE TRIAGE (OUTPATIENT)
Dept: FAMILY MEDICINE | Facility: CLINIC | Age: 76
End: 2021-11-24
Payer: COMMERCIAL

## 2021-11-24 NOTE — TELEPHONE ENCOUNTER
"     Reason for Disposition    Patient wants to be seen    Additional Information    Negative: Chest pain    Negative: Small area of swelling and followed an insect bite to the area    Negative: Followed a knee injury    Negative: Ankle or foot injury    Negative: Pregnant with leg swelling or edema    Negative: Difficulty breathing at rest    Negative: Entire foot is cool or blue in comparison to other side    Negative: SEVERE swelling (e.g., swelling extends above knee, entire leg is swollen, weeping fluid)    Negative: Thigh or calf pain and only 1 side and present > 1 hour    Negative: Thigh, calf, or ankle swelling in only one leg    Negative: Thigh, calf, or ankle swelling in both legs, but one side is definitely more swollen (Exception: longstanding difference between legs)    Negative: Cast on leg or ankle and has increasing pain    Negative: Can't walk or can barely stand (new onset)    Negative: Fever and red area (or area very tender to touch)    Negative: Patient sounds very sick or weak to the triager    Negative: Swelling of face, arm or hands (Exception: slight puffiness of fingers during hot weather)    Negative: Pregnant > 20 weeks and sudden weight gain (i.e., > 2 lbs, 1 kg in one week)    Negative: MODERATE swelling of both ankles (e.g., swelling extends up to the knees) AND new onset or worsening    Negative: Difficulty breathing with exertion AND worsening or new onset    Negative: Looks like a boil, infected sore, deep ulcer, or other infected rash (spreading redness, pus)    Answer Assessment - Initial Assessment Questions  1. ONSET: \"When did the swelling start?\" (e.g., minutes, hours, days)      This has been going off and on for a long time  2. LOCATION: \"What part of the leg is swollen?\"  \"Are both legs swollen or just one leg?\"      Left leg mid calf to ankle  3. SEVERITY: \"How bad is the swelling?\" (e.g., localized; mild, moderate, severe)   - Localized - small area of swelling localized " "to one leg   - MILD pedal edema - swelling limited to foot and ankle, pitting edema < 1/4 inch (6 mm) deep, rest and elevation eliminate most or all swelling   - MODERATE edema - swelling of lower leg to knee, pitting edema > 1/4 inch (6 mm) deep, rest and elevation only partially reduce swelling   - SEVERE edema - swelling extends above knee, facial or hand swelling present       Mild-moderate  4. REDNESS: \"Does the swelling look red or infected?\"      no  5. PAIN: \"Is the swelling painful to touch?\" If so, ask: \"How painful is it?\"   (Scale 1-10; mild, moderate or severe)      no  6. FEVER: \"Do you have a fever?\" If so, ask: \"What is it, how was it measured, and when did it start?\"       no  7. CAUSE: \"What do you think is causing the leg swelling?\"      \"bad\" foot surgery years ago  8. MEDICAL HISTORY: \"Do you have a history of heart failure, kidney disease, liver failure, or cancer?\"      no  9. RECURRENT SYMPTOM: \"Have you had leg swelling before?\" If so, ask: \"When was the last time?\" \"What happened that time?\"      Yes - this is ongoing  10. OTHER SYMPTOMS: \"Do you have any other symptoms?\" (e.g., chest pain, difficulty breathing)        no  11. PREGNANCY: \"Is there any chance you are pregnant?\" \"When was your last menstrual period?\"        no    Protocols used: LEG SWELLING AND EDEMA-A-OH      "

## 2021-12-15 ENCOUNTER — OFFICE VISIT (OUTPATIENT)
Dept: FAMILY MEDICINE | Facility: CLINIC | Age: 76
End: 2021-12-15
Payer: COMMERCIAL

## 2021-12-15 VITALS
TEMPERATURE: 97.3 F | SYSTOLIC BLOOD PRESSURE: 142 MMHG | BODY MASS INDEX: 33.46 KG/M2 | HEART RATE: 92 BPM | OXYGEN SATURATION: 96 % | DIASTOLIC BLOOD PRESSURE: 90 MMHG | WEIGHT: 169.9 LBS

## 2021-12-15 DIAGNOSIS — L71.9 ROSACEA: ICD-10-CM

## 2021-12-15 DIAGNOSIS — Z00.00 ANNUAL PHYSICAL EXAM: Primary | ICD-10-CM

## 2021-12-15 DIAGNOSIS — I10 BENIGN ESSENTIAL HYPERTENSION: ICD-10-CM

## 2021-12-15 DIAGNOSIS — M72.2 PLANTAR FASCIITIS: ICD-10-CM

## 2021-12-15 DIAGNOSIS — M20.42 HAMMER TOE OF LEFT FOOT: ICD-10-CM

## 2021-12-15 DIAGNOSIS — M21.612 BUNION, LEFT: ICD-10-CM

## 2021-12-15 DIAGNOSIS — J45.20 MILD INTERMITTENT EXTRINSIC ASTHMA WITHOUT COMPLICATION: ICD-10-CM

## 2021-12-15 LAB
ANION GAP SERPL CALCULATED.3IONS-SCNC: 4 MMOL/L (ref 3–14)
BUN SERPL-MCNC: 21 MG/DL (ref 7–30)
CALCIUM SERPL-MCNC: 9.2 MG/DL (ref 8.5–10.1)
CHLORIDE BLD-SCNC: 108 MMOL/L (ref 94–109)
CHOLEST SERPL-MCNC: 180 MG/DL
CO2 SERPL-SCNC: 30 MMOL/L (ref 20–32)
CREAT SERPL-MCNC: 0.65 MG/DL (ref 0.52–1.04)
FASTING STATUS PATIENT QL REPORTED: YES
GFR SERPL CREATININE-BSD FRML MDRD: 85 ML/MIN/1.73M2
GLUCOSE BLD-MCNC: 104 MG/DL (ref 70–99)
HDLC SERPL-MCNC: 72 MG/DL
LDLC SERPL CALC-MCNC: 95 MG/DL
NONHDLC SERPL-MCNC: 108 MG/DL
POTASSIUM BLD-SCNC: 3.8 MMOL/L (ref 3.4–5.3)
SODIUM SERPL-SCNC: 142 MMOL/L (ref 133–144)
TRIGL SERPL-MCNC: 65 MG/DL

## 2021-12-15 PROCEDURE — 99397 PER PM REEVAL EST PAT 65+ YR: CPT | Mod: 25 | Performed by: NURSE PRACTITIONER

## 2021-12-15 PROCEDURE — 36415 COLL VENOUS BLD VENIPUNCTURE: CPT | Performed by: NURSE PRACTITIONER

## 2021-12-15 PROCEDURE — 99214 OFFICE O/P EST MOD 30 MIN: CPT | Mod: 25 | Performed by: NURSE PRACTITIONER

## 2021-12-15 PROCEDURE — 80048 BASIC METABOLIC PNL TOTAL CA: CPT | Performed by: NURSE PRACTITIONER

## 2021-12-15 PROCEDURE — 80061 LIPID PANEL: CPT | Performed by: NURSE PRACTITIONER

## 2021-12-15 RX ORDER — AMLODIPINE BESYLATE 5 MG/1
5 TABLET ORAL DAILY
Qty: 90 TABLET | Refills: 3 | Status: SHIPPED | OUTPATIENT
Start: 2021-12-15 | End: 2021-12-15

## 2021-12-15 RX ORDER — AMLODIPINE BESYLATE 5 MG/1
5 TABLET ORAL DAILY
Qty: 90 TABLET | Refills: 3 | Status: SHIPPED | OUTPATIENT
Start: 2021-12-15 | End: 2023-01-19

## 2021-12-15 RX ORDER — LOSARTAN POTASSIUM 25 MG/1
25 TABLET ORAL DAILY
Qty: 90 TABLET | Refills: 3 | Status: SHIPPED | OUTPATIENT
Start: 2021-12-15 | End: 2023-01-19

## 2021-12-15 RX ORDER — HYDROCHLOROTHIAZIDE 12.5 MG/1
12.5 TABLET ORAL EVERY OTHER DAY
Qty: 45 TABLET | Refills: 3 | Status: SHIPPED | OUTPATIENT
Start: 2021-12-15 | End: 2023-02-20

## 2021-12-15 RX ORDER — MONTELUKAST SODIUM 10 MG/1
10 TABLET ORAL AT BEDTIME
Qty: 90 TABLET | Refills: 3 | Status: SHIPPED | OUTPATIENT
Start: 2021-12-15 | End: 2023-01-04

## 2021-12-15 RX ORDER — METRONIDAZOLE 7.5 MG/G
GEL TOPICAL
Qty: 45 G | Refills: 3 | Status: SHIPPED | OUTPATIENT
Start: 2021-12-15 | End: 2022-10-25

## 2021-12-15 ASSESSMENT — ENCOUNTER SYMPTOMS
HEARTBURN: 1
HEMATOCHEZIA: 0
SORE THROAT: 0
MYALGIAS: 0
ABDOMINAL PAIN: 0
EYE PAIN: 0
ARTHRALGIAS: 1
NAUSEA: 0
DYSURIA: 0
BREAST MASS: 0
JOINT SWELLING: 1
CHILLS: 0
DIARRHEA: 0
NERVOUS/ANXIOUS: 0
FEVER: 0
SHORTNESS OF BREATH: 0
WEAKNESS: 0
PALPITATIONS: 0
HEMATURIA: 0
CONSTIPATION: 0
COUGH: 0
PARESTHESIAS: 0
DIZZINESS: 0
FREQUENCY: 0
HEADACHES: 0

## 2021-12-15 ASSESSMENT — ACTIVITIES OF DAILY LIVING (ADL): CURRENT_FUNCTION: NO ASSISTANCE NEEDED

## 2021-12-15 NOTE — PROGRESS NOTES
"SUBJECTIVE:   Violet Robins is a 76 year old female who presents for Preventive Visit.      Patient has been advised of split billing requirements and indicates understanding: Yes   Are you in the first 12 months of your Medicare coverage?  No    Healthy Habits:     In general, how would you rate your overall health?  Good    Frequency of exercise:  1 day/week    Duration of exercise:  15-30 minutes    Do you usually eat at least 4 servings of fruit and vegetables a day, include whole grains    & fiber and avoid regularly eating high fat or \"junk\" foods?  Yes    Taking medications regularly:  Yes    Medication side effects:  None    Ability to successfully perform activities of daily living:  No assistance needed    Home Safety:  No safety concerns identified    Hearing Impairment:  No hearing concerns    In the past 6 months, have you been bothered by leaking of urine? Yes    In general, how would you rate your overall mental or emotional health?  Good      PHQ-2 Total Score: 0    Additional concerns today:  Yes (Swelling in her left foot, ankle and leg. This has been going on ever since she had bunion surgery years ago. Started to get worse the past 2 years. )    Do you feel safe in your environment? Yes    Have you ever done Advance Care Planning? (For example, a Health Directive, POLST, or a discussion with a medical provider or your loved ones about your wishes): No, advance care planning information given to patient to review.  Patient declined advance care planning discussion at this time.    Fall risk  Fallen 2 or more times in the past year?: No  Any fall with injury in the past year?: No  click delete button to remove this line now  Cognitive Screening   1) Repeat 3 items (Leader, Season, Table)    2) Clock draw: NORMAL  3) 3 item recall: Recalls 1 object   Results: NORMAL clock, 1-2 items recalled: COGNITIVE IMPAIRMENT LESS LIKELY    Mini-CogTM Copyright S Ilir. Licensed by the author for use in " [facility]; reprinted with permission (arcadio@[facility]). All rights reserved.      Do you have sleep apnea, excessive snoring or daytime drowsiness?: no    Reviewed and updated as needed this visit by clinical staff                Reviewed and updated as needed this visit by Provider               Social History     Tobacco Use     Smoking status: Never Smoker     Smokeless tobacco: Never Used   Substance Use Topics     Alcohol use: Not Currently     If you drink alcohol do you typically have >3 drinks per day or >7 drinks per week? No    Hypertension Follow-up      Do you check your blood pressure regularly outside of the clinic? Yes     Are you following a low salt diet? Yes    Are your blood pressures ever more than 140 on the top number (systolic) OR more   than 90 on the bottom number (diastolic), for example 140/90? No      Current providers sharing in care for this patient include:   Patient Care Team:  Komal Lynch APRN CNP as PCP - General (Nurse Practitioner Primary Care)  Komal Lynch APRN CNP as Assigned PCP    The following health maintenance items are reviewed in Epic and correct as of today:      Mammogram Screening - Patient over age 75, has elected to continue with screening.  Pertinent mammograms are reviewed under the imaging tab.    Review of Systems   Constitutional: Negative for chills and fever.   HENT: Positive for congestion and ear pain. Negative for hearing loss and sore throat.    Eyes: Negative for pain and visual disturbance.   Respiratory: Negative for cough and shortness of breath.    Cardiovascular: Positive for peripheral edema. Negative for chest pain and palpitations.   Gastrointestinal: Positive for heartburn. Negative for abdominal pain, constipation, diarrhea, hematochezia and nausea.   Breasts:  Negative for tenderness, breast mass and discharge.   Genitourinary: Positive for urgency. Negative for dysuria, frequency, genital sores, hematuria,  "pelvic pain, vaginal bleeding and vaginal discharge.   Musculoskeletal: Positive for arthralgias and joint swelling. Negative for myalgias.   Skin: Negative for rash.   Neurological: Negative for dizziness, weakness, headaches and paresthesias.   Psychiatric/Behavioral: The patient is not nervous/anxious.      Constitutional, HEENT, cardiovascular, pulmonary, gi and gu systems are negative, except as otherwise noted.    OBJECTIVE:   There were no vitals taken for this visit. Estimated body mass index is 33.48 kg/m  as calculated from the following:    Height as of 6/18/20: 1.518 m (4' 11.75\").    Weight as of 7/19/21: 77.1 kg (170 lb).  Physical Exam  GENERAL: healthy, alert and no distress  EYES: Eyes grossly normal to inspection, PERRL and conjunctivae and sclerae normal  HENT: ear canals and TM's normal, nose and mouth without ulcers or lesions  NECK: no adenopathy, no asymmetry, masses, or scars and thyroid normal to palpation  RESP: lungs clear to auscultation - no rales, rhonchi or wheezes  CV: regular rate and rhythm, normal S1 S2, no S3 or S4, no murmur, click or rub, no peripheral edema and peripheral pulses strong  MS: left foot moderate edema  SKIN: no suspicious lesions or rashes  NEURO: Normal strength and tone, mentation intact and speech normal  PSYCH: mentation appears normal, affect normal/bright    Diagnostic Test Results:  Labs reviewed in Epic    ASSESSMENT / PLAN:   (Z00.00) Annual physical exam  (primary encounter diagnosis)  Comment:   Plan: Lipid panel reflex to direct LDL Fasting            (I10) Benign essential hypertension  Comment: well controlled   Plan: losartan (COZAAR) 25 MG tablet, Basic metabolic        panel  (Ca, Cl, CO2, Creat, Gluc, K, Na, BUN),         hydrochlorothiazide (HYDRODIURIL) 12.5 MG         tablet, amLODIPine (NORVASC) 5 MG tablet,             (J45.20) Mild intermittent extrinsic asthma without complication  Comment: well controlled   Plan: continue montelukast " "(SINGULAIR) 10 MG tablet daily evening             (L71.9) Rosacea  Comment: stable   Plan: continue metroNIDAZOLE (METROGEL) 0.75 % external gel            (M72.2) Plantar fasciitis  Comment: recommended to try stretching exercises and follow up with ortho  Plan: Orthopedic  Referral, Physical Therapy        Referral            (M21.990) Bunion, left  Comment:   Plan: Orthopedic  Referral            (M20.42) Hammer toe of left foot    Plan: Orthopedic  Referral              Patient has been advised of split billing requirements and indicates understanding: Yes  COUNSELING:  Reviewed preventive health counseling, as reflected in patient instructions       Regular exercise       Healthy diet/nutrition    Estimated body mass index is 33.48 kg/m  as calculated from the following:    Height as of 6/18/20: 1.518 m (4' 11.75\").    Weight as of 7/19/21: 77.1 kg (170 lb).        She reports that she has never smoked. She has never used smokeless tobacco.      Appropriate preventive services were discussed with this patient, including applicable screening as appropriate for cardiovascular disease, diabetes, osteopenia/osteoporosis, and glaucoma.  As appropriate for age/gender, discussed screening for colorectal cancer, prostate cancer, breast cancer, and cervical cancer. Checklist reviewing preventive services available has been given to the patient.    Reviewed patients plan of care and provided an AVS. The Basic Care Plan (routine screening as documented in Health Maintenance) for Violet meets the Care Plan requirement. This Care Plan has been established and reviewed with the Patient.    Counseling Resources:  ATP IV Guidelines  Pooled Cohorts Equation Calculator  Breast Cancer Risk Calculator  Breast Cancer: Medication to Reduce Risk  FRAX Risk Assessment  ICSI Preventive Guidelines  Dietary Guidelines for Americans, 2010  USDA's MyPlate  ASA Prophylaxis  Lung CA Screening    Komal V. " Formogey, APRN CNP  [facility]    Identified Health Risks:

## 2021-12-15 NOTE — PATIENT INSTRUCTIONS
Continue Losartan 25 mg daily morning  Continue Norvasc 5 mg daily in the evening     Start hydrochlorothiazide 12.5 mg every other day in the mornings, will help with swelling    Recommend follow up with podiatrist and start physical therapy

## 2021-12-15 NOTE — LETTER
December 15, 2021      Violet I Shimon  [address] [facility]    [place] [facility] [address]        Dear ,    We are writing to inform you of your test results.    Your test results fall within the expected range(s) or remain unchanged from previous results.  Please continue with current treatment plan.    Resulted Orders   Lipid panel reflex to direct LDL Fasting   Result Value Ref Range    Cholesterol 180 <200 mg/dL    Triglycerides 65 <150 mg/dL    Direct Measure HDL 72 >=50 mg/dL    LDL Cholesterol Calculated 95 <=100 mg/dL    Non HDL Cholesterol 108 <130 mg/dL    Patient Fasting > 8hrs? Yes     Narrative    Cholesterol  Desirable:  <200 mg/dL    Triglycerides  Normal:  Less than 150 mg/dL  Borderline High:  150-199 mg/dL  High:  200-499 mg/dL  Very High:  Greater than or equal to 500 mg/dL    Direct Measure HDL  Female:  Greater than or equal to 50 mg/dL   Male:  Greater than or equal to 40 mg/dL    LDL Cholesterol  Desirable:  <100mg/dL  Above Desirable:  100-129 mg/dL   Borderline High:  130-159 mg/dL   High:  160-189 mg/dL   Very High:  >= 190 mg/dL    Non HDL Cholesterol  Desirable:  130 mg/dL  Above Desirable:  130-159 mg/dL  Borderline High:  160-189 mg/dL  High:  190-219 mg/dL  Very High:  Greater than or equal to 220 mg/dL   Basic metabolic panel  (Ca, Cl, CO2, Creat, Gluc, K, Na, BUN)   Result Value Ref Range    Sodium 142 133 - 144 mmol/L    Potassium 3.8 3.4 - 5.3 mmol/L    Chloride 108 94 - 109 mmol/L    Carbon Dioxide (CO2) 30 20 - 32 mmol/L    Anion Gap 4 3 - 14 mmol/L    Urea Nitrogen 21 7 - 30 mg/dL    Creatinine 0.65 0.52 - 1.04 mg/dL    Calcium 9.2 8.5 - 10.1 mg/dL    Glucose 104 (H) 70 - 99 mg/dL    GFR Estimate 85 >60 mL/min/1.73m2      Comment:      As of July 11, 2021, eGFR is calculated by the CKD-EPI creatinine equation, without race adjustment. eGFR can be influenced by muscle mass, exercise, and diet. The reported eGFR is an estimation only and is only applicable if the  renal function is stable.       If you have any questions or concerns, please call the clinic at the number listed above.       Sincerely,      BERTA Chand CNP/bmc

## 2021-12-16 ASSESSMENT — ASTHMA QUESTIONNAIRES: ACT_TOTALSCORE: 24

## 2021-12-21 ENCOUNTER — HOSPITAL ENCOUNTER (OUTPATIENT)
Dept: PHYSICAL THERAPY | Facility: CLINIC | Age: 76
Setting detail: THERAPIES SERIES
End: 2021-12-21
Attending: NURSE PRACTITIONER
Payer: MEDICARE

## 2021-12-21 DIAGNOSIS — M72.2 PLANTAR FASCIITIS: ICD-10-CM

## 2021-12-21 PROCEDURE — 97110 THERAPEUTIC EXERCISES: CPT | Mod: GP | Performed by: PHYSICAL MEDICINE & REHABILITATION

## 2021-12-21 PROCEDURE — 97161 PT EVAL LOW COMPLEX 20 MIN: CPT | Mod: GP | Performed by: PHYSICAL MEDICINE & REHABILITATION

## 2021-12-21 NOTE — PROGRESS NOTES
"   12/21/21 1400   General Information   Type of Visit Initial OP Ortho PT Evaluation   Start of Care Date 12/21/21   Referring Physician Komal Lynch APRN CNP   Patient/Family Goals Statement improve pain with walking and standing   Orders Evaluate and Treat   Date of Order 12/15/21   Certification Required? Yes  (Medicare/Medica)   Medical Diagnosis Plantar fasciitis    Surgical/Medical history reviewed Yes   Precautions/Limitations no known precautions/limitations   General Information Comments PMHx per pt report: asthma, cancer, high BP, bladder conrol, arthrits, bunion surgery, RTC surgery, neck surgery   Body Part(s)   Body Part(s) Ankle/Foot   Presentation and Etiology   Pertinent history of current problem (include personal factors and/or comorbidities that impact the POC) Pt arrived to PT today for arch support pain on L foot. Notes bunion surgery B, R turned out well but L has been painful since. Reports arch fell. Has tried buying arches that do help some. Notes standing without support is very painful. Reports surgery 5 years ago.   Impairments A. Pain;B. Decreased WB tolerance;C. Swelling;H. Impaired gait   Functional Limitations perform activities of daily living;perform desired leisure / sports activities   Symptom Location L foot   How/Where did it occur Other   Onset date of current episode/exacerbation 12/15/21  (date of order. pain started 5 years ago)   Chronicity Chronic   Pain rating (0-10 point scale) Best (/10);Worst (/10)   Best (/10) 4   Worst (/10) unable to quantify   Pain quality E. Shooting   Frequency of pain/symptoms B. Intermittent   Pain/symptoms are: Worse during the day   Pain/symptoms exacerbated by B. Walking;G. Certain positions;M. Other   Pain exacerbation comment \"standing\"   Pain/symptoms eased by A. Sitting;J. Braces/supports;K. Other   Pain eased by comment \"putting put leg up\"   Progression of symptoms since onset: Worsened   Prior Level of Function   Prior " Level of Function-Mobility independent   Prior Level of Function-ADLs independent   Current Level of Function   Current Community Support Family/friend caregiver   Patient role/employment history F. Retired   Living environment House/townhome   Home/community accessibility note stairs in home with railings   Current equipment-Gait/Locomotion None   Fall Risk Screen   Fall screen completed by PT   Have you fallen 2 or more times in the past year? No   Have you fallen and had an injury in the past year? Yes   Timed Up and Go score (seconds) 11.7   Is patient a fall risk? No   Fall screen comments Reports 1 fall in the last year in the snow, states she just got some bruises.  Pt demonstrated safe gait mechanics during TUG and stair navigation   Abuse Screen (yes response referral indicated)   Feels Unsafe at Home or Work/School no   Feels Threatened by Someone no   Does Anyone Try to Keep You From Having Contact with Others or Doing Things Outside Your Home? no   Physical Signs of Abuse Present no   Functional Scales   Functional Scales Other   Other Scales  LEFS: 45/80   Ankle/Foot Objective Findings   Side (if bilateral, select both right and left) Left   Integumentary no visible edema   Posture Forward head position;Protracted shoulders   Gait/Locomotion pt amb with increased knee valgus and pes planus on L   Foot Position In Standing pes planus B (L>R)   Ankle/Foot Strength Comments notes slight increase in sx with all MMT. limited ROM for testing positions   Anterior Drawer Test normal   Posterior Drawer Test normal   Palpation pain over navicular and plantar fascia    Accessory Motion/Joint Mobility hypomobility of talocrual and subtalar jts   Left DF (Knee Ext) AROM 5*, slight pain   Left PF AROM 60*, slight pain   Left Calcanceal Inversion AROM 10*, pain   Left Calcaneal Eversion AROM 12*, pain   Left Great Toe Flexion AROM WFL   Left DF/Inversion Strength 4+/5   Left DF/Eversion Strength 4+/5   Left  PF/Inversion Strength 4+/5   Left PF/Eversion Strength 4+/5   Left PF Strength 4+/5   Left Gastroc (in WB) Flexibility limited   Left Soleus (in WB) Flexibility limited   Planned Therapy Interventions   Planned Therapy Interventions ADL retraining;balance training;gait training;joint mobilization;manual therapy;neuromuscular re-education;motor coordination training;ROM;strengthening;stretching;transfer training   Planned Modality Interventions   Planned Modality Interventions Cryotherapy;Electrical stimulation;Hot packs;TENS;Ultrasound   Planned Modality Interventions Comments only as needed   Clinical Impression   Criteria for Skilled Therapeutic Interventions Met yes, treatment indicated   PT Diagnosis L foot pain secondary to plantar fasciitis   Influenced by the following impairments decreased ROM, decreased strength, impaired gait, pain   Functional limitations due to impairments walking, standing   Clinical Presentation Stable/Uncomplicated   Clinical Presentation Rationale comorbidities, LEFS, clinical judgement   Clinical Decision Making (Complexity) Low complexity   Therapy Frequency 1 time/week   Predicted Duration of Therapy Intervention (days/wks) 8 weeks   Risk & Benefits of therapy have been explained Yes   Patient, Family & other staff in agreement with plan of care Yes   Clinical Impression Comments Pt is a 76 y.o. female who presented to the PT clinic today with a rehab diagnosis of L foot pain secondary to plantar fasciitis as evidenced by decreased ROM, decreased strength, impaired gait and pain. Pt is appropriate for skilled PT to address previously listed impairments in order to decrease difficulty with standing and walking.    Education Assessment   Preferred Learning Style Listening;Reading;Demonstration;Pictures/video   Barriers to Learning No barriers   ORTHO GOALS   PT Ortho Eval Goals 1;2;3;4   Ortho Goal 1   Goal Identifier 1   Goal Description Pt will be able to stand 20 minutes without  increase in sx in order to decrease difficulty with ADLs.    Target Date 01/18/22   Ortho Goal 2   Goal Identifier 2   Goal Description Pt will be able to amb 250' without increase in sx in order to decrease difficulty with community amb.    Target Date 02/01/22   Ortho Goal 3   Goal Identifier 3   Goal Description Pt will demonstrate full L ankle ROM and strength in order to decrease difficulty with ADLs.    Target Date 02/18/22   Ortho Goal 4   Goal Identifier 4   Goal Description Pt will be independent with HEP in order to self manage symptoms.    Target Date 02/18/22   Total Evaluation Time   PT Eval, Low Complexity Minutes (87755) 15   Therapy Certification   Certification date from 12/21/21   Certification date to 02/18/22   Medical Diagnosis Plantar fasciitis        Please contact me with any questions or concerns.    Thank you for your referral,     Crystal Veronica, PT, DPT  Physical Therapist  [place] [id number] [place] [address] [phone]

## 2021-12-21 NOTE — PROGRESS NOTES
[facility]    OUTPATIENT PHYSICAL THERAPY ORTHOPEDIC EVALUATION  PLAN OF TREATMENT FOR OUTPATIENT REHABILITATION  (COMPLETE FOR INITIAL CLAIMS ONLY)  Patient's Last Name, First Name, M.I.  YOB: 1945  Violet Robins    Provider s Name:  [facility]   Medical Record No.  [id number]   Start of Care Date:  12/21/21   Onset Date:  12/15/21 (date of order. pain started 5 years ago)   Type:     _X__PT   ___OT   ___SLP Medical Diagnosis:  Plantar fasciitis      PT Diagnosis:  L foot pain secondary to plantar fasciitis   Visits from SOC:  1      _________________________________________________________________________________  Plan of Treatment/Functional Goals:  ADL retraining,balance training,gait training,joint mobilization,manual therapy,neuromuscular re-education,motor coordination training,ROM,strengthening,stretching,transfer training     Cryotherapy,Electrical stimulation,Hot packs,TENS,Ultrasound  only as needed  Goals  Goal Identifier: 1  Goal Description: Pt will be able to stand 20 minutes without increase in sx in order to decrease difficulty with ADLs.   Target Date: 01/18/22    Goal Identifier: 2  Goal Description: Pt will be able to amb 250' without increase in sx in order to decrease difficulty with community amb.   Target Date: 02/01/22    Goal Identifier: 3  Goal Description: Pt will demonstrate full L ankle ROM and strength in order to decrease difficulty with ADLs.   Target Date: 02/18/22    Goal Identifier: 4  Goal Description: Pt will be independent with HEP in order to self manage symptoms.   Target Date: 02/18/22      Therapy Frequency:  1 time/week  Predicted Duration of Therapy Intervention:  8 weeks    Crystal Veronica PT                 I CERTIFY THE NEED FOR THESE SERVICES FURNISHED UNDER        THIS PLAN OF TREATMENT AND WHILE UNDER MY CARE      (Physician co-signature of this document indicates review and certification of the therapy plan).                       Certification Date From:  12/21/21   Certification Date To:  02/18/22    Referring Provider:  Komal Lynch APRN CNP    Initial Assessment        See Epic Evaluation Start of Care Date: 12/21/21

## 2021-12-29 ENCOUNTER — HOSPITAL ENCOUNTER (OUTPATIENT)
Dept: PHYSICAL THERAPY | Facility: CLINIC | Age: 76
Setting detail: THERAPIES SERIES
End: 2021-12-29
Attending: NURSE PRACTITIONER
Payer: MEDICARE

## 2021-12-29 PROCEDURE — 97110 THERAPEUTIC EXERCISES: CPT | Mod: GP | Performed by: PHYSICAL MEDICINE & REHABILITATION

## 2021-12-29 PROCEDURE — 97140 MANUAL THERAPY 1/> REGIONS: CPT | Mod: GP | Performed by: PHYSICAL MEDICINE & REHABILITATION

## 2022-01-05 ENCOUNTER — HOSPITAL ENCOUNTER (OUTPATIENT)
Dept: PHYSICAL THERAPY | Facility: CLINIC | Age: 77
Setting detail: THERAPIES SERIES
End: 2022-01-05
Attending: NURSE PRACTITIONER
Payer: MEDICARE

## 2022-01-05 PROCEDURE — 97110 THERAPEUTIC EXERCISES: CPT | Mod: GP | Performed by: PHYSICAL MEDICINE & REHABILITATION

## 2022-01-05 PROCEDURE — 97140 MANUAL THERAPY 1/> REGIONS: CPT | Mod: GP | Performed by: PHYSICAL MEDICINE & REHABILITATION

## 2022-01-26 ENCOUNTER — HOSPITAL ENCOUNTER (OUTPATIENT)
Dept: PHYSICAL THERAPY | Facility: CLINIC | Age: 77
Setting detail: THERAPIES SERIES
End: 2022-01-26
Attending: NURSE PRACTITIONER
Payer: MEDICARE

## 2022-01-26 PROCEDURE — 97110 THERAPEUTIC EXERCISES: CPT | Mod: GP | Performed by: PHYSICAL MEDICINE & REHABILITATION

## 2022-01-26 NOTE — PROGRESS NOTES
[facility]    Outpatient Physical Therapy Discharge Note  Patient: Violet Robins  : 1945    Beginning/End Dates of Reporting Period:  21 to 22    Referring Provider: Komal Lynch APRN CNP    Therapy Diagnosis: L foot pain secondary to plantar fasciitis     Client Self Report: Pt reports L ankle/foot has been feeling pretty good the last couple weeks. Pt states she can now shower again without shower shoes.     Objective Measurements:  Objective Measure: L ankle ROM  Details: DF: 10*, PF: 60*, Inversion: 30*, Eversion: 20*, no pain  Objective Measure: L ankle strength  Details: 5/5 all motions, no pain    Outcome Measures (most recent score):  LEFS: 54/80 (45/50 at initial eval)    Goals:  Goal Identifier 1   Goal Description Pt will be able to stand 20 minutes without increase in sx in order to decrease difficulty with ADLs.    Target Date 22   Date Met  22   Progress (detail required for progress note):       Goal Identifier 2   Goal Description Pt will be able to amb 250' without increase in sx in order to decrease difficulty with community amb.    Target Date 22   Date Met  22   Progress (detail required for progress note):      Goal Identifier 3   Goal Description Pt will demonstrate full L ankle ROM and strength in order to decrease difficulty with ADLs.    Target Date 22   Date Met  22   Progress (detail required for progress note):     Goal Identifier 4   Goal Description Pt will be independent with HEP in order to self manage symptoms.    Target Date 22   Date Met  22   Progress (detail required for progress note):       Progress toward goals: Pt attended 4 PT sessions, achieving 4/4 short term and long term goals. Pt's ROM, strength, gait and pain have improved, and pt has returned to PLOF. PT and pt discussed she would  benefit from orthotics or inserts to further support longitudinal arch if desired. Pt is appropriate for D/C at this time as she has met all goals and is independent with HEP.     Plan:  Discharge from therapy.    Discharge:    Reason for Discharge: Patient has met all goals.    Equipment Issued: therabands    Discharge Plan: Patient to continue home program.    Please contact me with any questions or concerns.    Thank you for your referral,     Crystal Veronica, PT, DPT  Physical Therapist  [address] [address] [phone]

## 2022-02-03 ENCOUNTER — OFFICE VISIT (OUTPATIENT)
Dept: PODIATRY | Facility: CLINIC | Age: 77
End: 2022-02-03
Attending: NURSE PRACTITIONER
Payer: COMMERCIAL

## 2022-02-03 ENCOUNTER — ANCILLARY PROCEDURE (OUTPATIENT)
Dept: GENERAL RADIOLOGY | Facility: CLINIC | Age: 77
End: 2022-02-03
Attending: PODIATRIST
Payer: COMMERCIAL

## 2022-02-03 VITALS
BODY MASS INDEX: 33.18 KG/M2 | SYSTOLIC BLOOD PRESSURE: 138 MMHG | DIASTOLIC BLOOD PRESSURE: 88 MMHG | WEIGHT: 169 LBS | HEIGHT: 60 IN | HEART RATE: 93 BPM

## 2022-02-03 DIAGNOSIS — M20.42 ACQUIRED BILATERAL HAMMER TOES: Primary | ICD-10-CM

## 2022-02-03 DIAGNOSIS — M20.12 HALLUX VALGUS, LEFT: ICD-10-CM

## 2022-02-03 DIAGNOSIS — M20.41 ACQUIRED BILATERAL HAMMER TOES: Primary | ICD-10-CM

## 2022-02-03 DIAGNOSIS — M72.2 PLANTAR FASCIITIS, LEFT: ICD-10-CM

## 2022-02-03 PROCEDURE — 73630 X-RAY EXAM OF FOOT: CPT | Mod: LT | Performed by: RADIOLOGY

## 2022-02-03 PROCEDURE — 99203 OFFICE O/P NEW LOW 30 MIN: CPT | Performed by: PODIATRIST

## 2022-02-03 ASSESSMENT — MIFFLIN-ST. JEOR: SCORE: 1164.11

## 2022-02-03 ASSESSMENT — PAIN SCALES - GENERAL: PAINLEVEL: MODERATE PAIN (4)

## 2022-02-03 NOTE — NURSING NOTE
"Chief Complaint   Patient presents with     Consult     left foot pain, XR today       Initial /88   Pulse 93   Ht 1.518 m (4' 11.75\")   Wt 76.7 kg (169 lb)   BMI 33.28 kg/m   Estimated body mass index is 33.28 kg/m  as calculated from the following:    Height as of this encounter: 1.518 m (4' 11.75\").    Weight as of this encounter: 76.7 kg (169 lb).  Medications and allergies reviewed.      Harika COLLAZO MA    "

## 2022-02-03 NOTE — LETTER
2/3/2022         RE: Violet Robins  [address]  [address]  [place] [address]        Dear Colleague,    Thank you for referring your patient, Violet Robins, to the [facility] [place]. Please see a copy of my visit note below.    PATIENT HISTORY:  Violet Robins is a 76 year old female who presents to clinic in consultation at the request of  Komal Lynch C.N.P. with a chief complaint of left foot pain.  The patient is seen by themselves.  The patient relates the pain is primarily located around the great left toe joint and second toe.  Has had a past surgery and is having pain and swelling in left foot that has been going on for 5 year(s).  The patient has previously tried inserts, shoes, and physical therapy with little relief.  Had surgery on feet about 15 years ago.  The patient is retired.  Any previous notes and studies that pertain to the patient's condition were reviewed.    Pertinent medical, surgical and family history was reviewed in Epic chart and include cervical degenerative disc disease    Medications:   Current Outpatient Medications:      amLODIPine (NORVASC) 5 MG tablet, Take 1 tablet (5 mg) by mouth daily, Disp: 90 tablet, Rfl: 3     ascorbic acid 500 MG TABS, Take 1-2 mg by mouth every 24 hours, Disp: , Rfl:      aspirin (ASA) 81 MG tablet, Take 1 tablet by mouth every 24 hours, Disp: , Rfl:      Calcium Carbonate-Vitamin D (CALCIUM + D PO), Take  by mouth., Disp: , Rfl:      Cholecalciferol (VITAMIN D) 1000 UNITS capsule, Take 1 capsule by mouth daily., Disp: , Rfl:      Fexofenadine HCl (MUCINEX ALLERGY PO), Take by mouth 2 times daily, Disp: , Rfl:      FISH OIL-KRILL OIL PO, Take 1 capsule by mouth daily , Disp: , Rfl:      Glucosamine-Chondroit-Vit C-Mn (GLUCOSAMINE 1500 COMPLEX) CAPS, Take 1 capsule by mouth daily , Disp: , Rfl:      hydrochlorothiazide (HYDRODIURIL) 12.5 MG tablet, Take 1 tablet (12.5 mg) by mouth every other  "day, Disp: 45 tablet, Rfl: 3     losartan (COZAAR) 25 MG tablet, Take 1 tablet (25 mg) by mouth daily, Disp: 90 tablet, Rfl: 3     metroNIDAZOLE (METROGEL) 0.75 % external gel, APPLY TO AFFECTED AREA(S) TWICE DAILY, Disp: 45 g, Rfl: 3     montelukast (SINGULAIR) 10 MG tablet, Take 1 tablet (10 mg) by mouth At Bedtime, Disp: 90 tablet, Rfl: 3     Multiple Vitamin (MULTIVITAMIN OR), Take 1 tablet by mouth daily , Disp: , Rfl:      VITAMIN E, Take 1 capsule by mouth daily , Disp: , Rfl:      albuterol (PROAIR HFA/PROVENTIL HFA/VENTOLIN HFA) 108 (90 Base) MCG/ACT inhaler, Inhale 1-2 puffs into the lungs every 6 hours as needed for shortness of breath / dyspnea or wheezing (Patient not taking: Reported on 6/18/2020), Disp: 18 g, Rfl: 3     Allergies:    Allergies   Allergen Reactions     Bee Venom Hives and Swelling     PN: swelling and then went to cellulitis     Oxycontin [Kdc:Ci Pigment Blue 63+Oxycodone] Nausea and Vomiting     Codeine Nausea and Vomiting     Latex Other (See Comments)     PN: Converted from LW Latex Sensitivity Flag     Lisinopril Cough     Tramadol Nausea and Vomiting       Vitals: /88   Pulse 93   Ht 1.518 m (4' 11.75\")   Wt 76.7 kg (169 lb)   BMI 33.28 kg/m    BMI= Body mass index is 33.28 kg/m .    LOWER EXTREMITY PHYSICAL EXAM    Dermatologic: Skin is intact to left lower extremity without significant lesions, rash or abrasion.        Vascular: DP & PT pulses are intact & regular on the left.   CFT and skin temperature is normal to the left lower extremity.     Neurologic: Lower extremity sensation is intact to light touch.  No evidence of weakness in the left lower extremity.        Musculoskeletal: Patient is ambulatory without assistive device or brace.  No gross ankle deformity noted.  No foot or ankle joint effusion is noted.  Noted moderate bunion deformity on the left with contracted hammertoe deformities.  Noted pain on palpation along the medial band of the plantar fascia on " the left arch.    Diagnostics:  Radiographs included three views of the left foot demonstrating moderate to severe bunion deformity with an increased intermetatarsal angle and hallux abductus angle.  Noted contracted lesser toe hammertoe deformities.  No cortical erosions or periosteal elevation.  All joint margins appear stable.  There is no apparent fracture or tumor formation noted.  There is no evidence of foreign body.  The images were independently reviewed by myself along with the patient explaining the findings.      ASSESSMENT / PLAN:     ICD-10-CM    1. Acquired bilateral hammer toes  M20.41 XR Foot Left G/E 3 Views    M20.42 [facility]   2. Hallux valgus, left  M20.12 [facility]   3. Plantar fasciitis, left  M72.2 [facility]       I have explained to Violet about the conditions.  We discussed the underlying contributing factors to the condition as well as treatment options along with expected length of recovery.  At this time, the patient was educated on the importance of offloading supportive shoes and other devices.  I demonstrated to the patient calf stretches to perform every hour daily until symptoms resolve.  After symptoms resolve, the patient was advised to perform the stretches 3 times daily to prevent future recurrence.  The patient was instructed to perform warm soaks with Epson salt after which to also apply over-the-counter Voltaren gel to deeply massage the injured tissue.  The patient was instructed to do this on a daily basis until symptoms resolve.  The patient may also take over-the-counter NSAID medication, if tolerated, to help further reduce the inflammation tissue.   The patient was advised to take this type of medication with food to prevent stomach irritation and to stop taking the medication if stomach irritation occurs.  The patient was prescribed accommodative custom orthotics that will aid in offloading the tension  forces to the soft tissues and prevent further inflammation.   The patient will return in four weeks for reevaluation if the symptoms do not resolve.      Violet verbalized agreement with and understanding of the rational for the diagnosis and treatment plan.  All questions were answered to best of my ability and the patient's satisfaction. The patient was advised to contact the clinic with any questions that may arise after the clinic visit.      Disclaimer: This note consists of symbols derived from keyboarding, dictation and/or voice recognition software. As a result, there may be errors in the script that have gone undetected. Please consider this when interpreting information found in this chart.       FABRICE Isbell D.P.M., F.ARIES.C.F.A.S.        Again, thank you for allowing me to participate in the care of your patient.        Sincerely,        Pola Isbell DPM

## 2022-02-03 NOTE — PROGRESS NOTES
PATIENT HISTORY:  Violet Robins is a 76 year old female who presents to clinic in consultation at the request of  Komal Lynch C.N.P. with a chief complaint of left foot pain.  The patient is seen by themselves.  The patient relates the pain is primarily located around the great left toe joint and second toe.  Has had a past surgery and is having pain and swelling in left foot that has been going on for 5 year(s).  The patient has previously tried inserts, shoes, and physical therapy with little relief.  Had surgery on feet about 15 years ago.  The patient is retired.  Any previous notes and studies that pertain to the patient's condition were reviewed.    Pertinent medical, surgical and family history was reviewed in Epic chart and include cervical degenerative disc disease    Medications:   Current Outpatient Medications:      amLODIPine (NORVASC) 5 MG tablet, Take 1 tablet (5 mg) by mouth daily, Disp: 90 tablet, Rfl: 3     ascorbic acid 500 MG TABS, Take 1-2 mg by mouth every 24 hours, Disp: , Rfl:      aspirin (ASA) 81 MG tablet, Take 1 tablet by mouth every 24 hours, Disp: , Rfl:      Calcium Carbonate-Vitamin D (CALCIUM + D PO), Take  by mouth., Disp: , Rfl:      Cholecalciferol (VITAMIN D) 1000 UNITS capsule, Take 1 capsule by mouth daily., Disp: , Rfl:      Fexofenadine HCl (MUCINEX ALLERGY PO), Take by mouth 2 times daily, Disp: , Rfl:      FISH OIL-KRILL OIL PO, Take 1 capsule by mouth daily , Disp: , Rfl:      Glucosamine-Chondroit-Vit C-Mn (GLUCOSAMINE 1500 COMPLEX) CAPS, Take 1 capsule by mouth daily , Disp: , Rfl:      hydrochlorothiazide (HYDRODIURIL) 12.5 MG tablet, Take 1 tablet (12.5 mg) by mouth every other day, Disp: 45 tablet, Rfl: 3     losartan (COZAAR) 25 MG tablet, Take 1 tablet (25 mg) by mouth daily, Disp: 90 tablet, Rfl: 3     metroNIDAZOLE (METROGEL) 0.75 % external gel, APPLY TO AFFECTED AREA(S) TWICE DAILY, Disp: 45 g, Rfl: 3     montelukast (SINGULAIR) 10 MG tablet, Take  "1 tablet (10 mg) by mouth At Bedtime, Disp: 90 tablet, Rfl: 3     Multiple Vitamin (MULTIVITAMIN OR), Take 1 tablet by mouth daily , Disp: , Rfl:      VITAMIN E, Take 1 capsule by mouth daily , Disp: , Rfl:      albuterol (PROAIR HFA/PROVENTIL HFA/VENTOLIN HFA) 108 (90 Base) MCG/ACT inhaler, Inhale 1-2 puffs into the lungs every 6 hours as needed for shortness of breath / dyspnea or wheezing (Patient not taking: Reported on 6/18/2020), Disp: 18 g, Rfl: 3     Allergies:    Allergies   Allergen Reactions     Bee Venom Hives and Swelling     PN: swelling and then went to cellulitis     Oxycontin [Kdc:Ci Pigment Blue 63+Oxycodone] Nausea and Vomiting     Codeine Nausea and Vomiting     Latex Other (See Comments)     PN: Converted from LW Latex Sensitivity Flag     Lisinopril Cough     Tramadol Nausea and Vomiting       Vitals: /88   Pulse 93   Ht 1.518 m (4' 11.75\")   Wt 76.7 kg (169 lb)   BMI 33.28 kg/m    BMI= Body mass index is 33.28 kg/m .    LOWER EXTREMITY PHYSICAL EXAM    Dermatologic: Skin is intact to left lower extremity without significant lesions, rash or abrasion.        Vascular: DP & PT pulses are intact & regular on the left.   CFT and skin temperature is normal to the left lower extremity.     Neurologic: Lower extremity sensation is intact to light touch.  No evidence of weakness in the left lower extremity.        Musculoskeletal: Patient is ambulatory without assistive device or brace.  No gross ankle deformity noted.  No foot or ankle joint effusion is noted.  Noted moderate bunion deformity on the left with contracted hammertoe deformities.  Noted pain on palpation along the medial band of the plantar fascia on the left arch.    Diagnostics:  Radiographs included three views of the left foot demonstrating moderate to severe bunion deformity with an increased intermetatarsal angle and hallux abductus angle.  Noted contracted lesser toe hammertoe deformities.  No cortical erosions or " periosteal elevation.  All joint margins appear stable.  There is no apparent fracture or tumor formation noted.  There is no evidence of foreign body.  The images were independently reviewed by myself along with the patient explaining the findings.      ASSESSMENT / PLAN:     ICD-10-CM    1. Acquired bilateral hammer toes  M20.41 XR Foot Left G/E 3 Views    M20.42 [facility]   2. Hallux valgus, left  M20.12 [facility]   3. Plantar fasciitis, left  M72.2 [facility]       I have explained to Violet about the conditions.  We discussed the underlying contributing factors to the condition as well as treatment options along with expected length of recovery.  At this time, the patient was educated on the importance of offloading supportive shoes and other devices.  I demonstrated to the patient calf stretches to perform every hour daily until symptoms resolve.  After symptoms resolve, the patient was advised to perform the stretches 3 times daily to prevent future recurrence.  The patient was instructed to perform warm soaks with Epson salt after which to also apply over-the-counter Voltaren gel to deeply massage the injured tissue.  The patient was instructed to do this on a daily basis until symptoms resolve.  The patient may also take over-the-counter NSAID medication, if tolerated, to help further reduce the inflammation tissue.   The patient was advised to take this type of medication with food to prevent stomach irritation and to stop taking the medication if stomach irritation occurs.  The patient was prescribed accommodative custom orthotics that will aid in offloading the tension forces to the soft tissues and prevent further inflammation.   The patient will return in four weeks for reevaluation if the symptoms do not resolve.      Violet verbalized agreement with and understanding of the rational for the diagnosis and treatment plan.  All questions  were answered to best of my ability and the patient's satisfaction. The patient was advised to contact the clinic with any questions that may arise after the clinic visit.      Disclaimer: This note consists of symbols derived from keyboarding, dictation and/or voice recognition software. As a result, there may be errors in the script that have gone undetected. Please consider this when interpreting information found in this chart.       FABRICE Isbell D.P.M., [email or web address].

## 2022-02-03 NOTE — PATIENT INSTRUCTIONS
Next Steps:      1. Support:  a. Wear supportive shoes, sandals, boots and/or inserts that have a rigid supportive sole.    i. This will offload the majority of tension forces that travel through your feet every step you take.    1. Skechers Max Cushioning Elite/Premier   2. Skechers Relax Fit D'Lux Walker  3. Reebok Walk Ultra 7 DMX MAX   4. Hoka Bondi walking shoes  5. Superfeet inserts ([email or web address])  b. It is important that you also wear supportive shoe wear in the house to continue providing support to your feet.    c. You may always use a cushioned liner for your shoes if that makes your feet feel better.  2. Stretching  a. Calf stretching is essential to offload the tension forces that travel through your feet every step you take  b. Preferred calf stretch is the Runner's Stretch  i. Place one foot behind the other foot, flat against the ground (it is important to keep the heel on the ground).  The back leg is the one that will be stretched.  1. Start with the knee straight and lean your hips into the wall, counter or whatever you are leaning into - count to ten.  2. Next, bend the knee.  You should feel the stretch lower in the calf muscle - count to ten.  c. Repeat this stretch once an hour to start off with.  When symptoms subside, I recommend performing the stretch 3 times daily to prevent any future problems.                3. Tissue Massage  a. It is important that you physically loosen the inflammation tissue to help your body heal the injured tissue.  b. I recommend soaking your foot in warm water to increase the microcirculation to the soft tissues.  You may add Epson salt to the water if you prefer.  c. You may apply an over-the-counter muscle rub, such as Voltaren gel, and deeply massage the injured tissue.  4. Reduce Inflammation  a. You can ice the injured tissue with an ice pack with a light cloth covering or soaking in ice water 20 minutes to reduce any acute inflammation, typically at the  end of the day.      It is important to understand that most problems that develop in the foot and ankle are caused by excessive tension that cause microinjury to the soft tissues and inflammation in the foot and ankle.  By addressing the underlying causes with support and stretching as well as treating the current inflammatory conditions with tissue massage and anti-inflammatory treatments, most foot and ankle musculoskeletal conditions will resolve.  This may take time to heal.  However, if symptoms persist past 4 weeks you should return to the office for reevaluation to determine further treatment options.

## 2022-10-26 ENCOUNTER — HOSPITAL ENCOUNTER (OUTPATIENT)
Dept: MAMMOGRAPHY | Facility: CLINIC | Age: 77
Discharge: HOME | End: 2022-10-26
Attending: NURSE PRACTITIONER | Admitting: NURSE PRACTITIONER
Payer: MEDICARE

## 2022-10-26 DIAGNOSIS — Z12.31 VISIT FOR SCREENING MAMMOGRAM: ICD-10-CM

## 2022-10-26 PROCEDURE — 77067 SCR MAMMO BI INCL CAD: CPT

## 2023-01-04 ENCOUNTER — TELEPHONE (OUTPATIENT)
Dept: FAMILY MEDICINE | Facility: CLINIC | Age: 78
End: 2023-01-04

## 2023-01-04 DIAGNOSIS — L71.9 ROSACEA: ICD-10-CM

## 2023-01-04 DIAGNOSIS — J45.20 MILD INTERMITTENT EXTRINSIC ASTHMA WITHOUT COMPLICATION: ICD-10-CM

## 2023-01-04 RX ORDER — MONTELUKAST SODIUM 10 MG/1
TABLET ORAL
Qty: 90 TABLET | Refills: 0 | Status: SHIPPED | OUTPATIENT
Start: 2023-01-04 | End: 2023-01-19

## 2023-01-04 RX ORDER — METRONIDAZOLE 7.5 MG/G
GEL TOPICAL
Qty: 45 G | Refills: 0 | Status: SHIPPED | OUTPATIENT
Start: 2023-01-04 | End: 2023-01-19

## 2023-01-04 NOTE — TELEPHONE ENCOUNTER
"Armida needs a follow-up appointment. Thank you!    Maria T Everett RN      Requested Prescriptions   Pending Prescriptions Disp Refills     montelukast (SINGULAIR) 10 MG tablet [Pharmacy Med Name: Montelukast Sodium Oral Tablet 10 MG] 90 tablet 0     Sig: TAKE ONE TABLET BY MOUTH AT BEDTIME       Leukotriene Inhibitors Protocol Failed - 1/4/2023 11:13 AM        Failed - Asthma control assessment score within normal limits in last 6 months     Please review ACT score.           Failed - Recent (6 mo) or future (30 days) visit within the authorizing provider's specialty     Patient had office visit in the last 6 months or has a visit in the next 30 days with authorizing provider or within the authorizing provider's specialty.  See \"Patient Info\" tab in inbasket, or \"Choose Columns\" in Meds & Orders section of the refill encounter.            Passed - Patient is age 12 or older     If patient is under 16, ok to refill using age based dosing.           Passed - Medication is active on med list           metroNIDAZOLE (METROGEL) 0.75 % external gel [Pharmacy Med Name: metroNIDAZOLE External Gel 0.75 %] 45 g 0     Sig: APPLY TO AFFECTED AREA(S) TWICE DAILY       Topical Acne Medications Protocol Failed - 1/4/2023 11:13 AM        Failed - Recent (12 mo) or future (30 days) visit within the authorizing provider's specialty     Patient has had an office visit with the authorizing provider or a provider within the authorizing providers department within the previous 12 mos or has a future within next 30 days. See \"Patient Info\" tab in inshopasket, or \"Choose Columns\" in Meds & Orders section of the refill encounter.              Passed - Patient is 12 years of age or older        Passed - Medication is active on med list             "

## 2023-01-04 NOTE — TELEPHONE ENCOUNTER
Reason for Call:  Medication or medication refill:    Do you use a [facility]?  Name of the pharmacy and phone number for the current request:     Darrel Alonzo  [address] [place] [address]  [phone]    Name of the medication requested: montelukast (SINGULAIR) 10 MG tablet    Other request: only has 4 left    Can we leave a detailed message on this number? YES    Phone number patient can be reached at: Home number on file [phone] (home)    Call taken on 1/4/2023 at 11:36 AM by Leyda Woods

## 2023-01-19 ENCOUNTER — OFFICE VISIT (OUTPATIENT)
Dept: FAMILY MEDICINE | Facility: CLINIC | Age: 78
End: 2023-01-19
Payer: COMMERCIAL

## 2023-01-19 VITALS
DIASTOLIC BLOOD PRESSURE: 84 MMHG | RESPIRATION RATE: 16 BRPM | SYSTOLIC BLOOD PRESSURE: 128 MMHG | TEMPERATURE: 97.6 F | HEART RATE: 97 BPM | BODY MASS INDEX: 31.47 KG/M2 | OXYGEN SATURATION: 98 % | HEIGHT: 60 IN | WEIGHT: 160.3 LBS

## 2023-01-19 DIAGNOSIS — J45.20 MILD INTERMITTENT EXTRINSIC ASTHMA WITHOUT COMPLICATION: ICD-10-CM

## 2023-01-19 DIAGNOSIS — M25.512 CHRONIC PAIN OF BOTH SHOULDERS: ICD-10-CM

## 2023-01-19 DIAGNOSIS — G89.29 CHRONIC PAIN OF BOTH SHOULDERS: ICD-10-CM

## 2023-01-19 DIAGNOSIS — L71.9 ROSACEA: ICD-10-CM

## 2023-01-19 DIAGNOSIS — M25.511 CHRONIC PAIN OF BOTH SHOULDERS: ICD-10-CM

## 2023-01-19 DIAGNOSIS — I10 BENIGN ESSENTIAL HYPERTENSION: Primary | ICD-10-CM

## 2023-01-19 LAB
ANION GAP SERPL CALCULATED.3IONS-SCNC: 7 MMOL/L (ref 7–15)
BUN SERPL-MCNC: 17.7 MG/DL (ref 8–23)
CALCIUM SERPL-MCNC: 9.2 MG/DL (ref 8.8–10.2)
CHLORIDE SERPL-SCNC: 103 MMOL/L (ref 98–107)
CREAT SERPL-MCNC: 0.71 MG/DL (ref 0.51–0.95)
DEPRECATED HCO3 PLAS-SCNC: 28 MMOL/L (ref 22–29)
GFR SERPL CREATININE-BSD FRML MDRD: 86 ML/MIN/1.73M2
GLUCOSE SERPL-MCNC: 110 MG/DL (ref 70–99)
POTASSIUM SERPL-SCNC: 4.6 MMOL/L (ref 3.4–5.3)
SODIUM SERPL-SCNC: 138 MMOL/L (ref 136–145)

## 2023-01-19 PROCEDURE — 99214 OFFICE O/P EST MOD 30 MIN: CPT | Performed by: NURSE PRACTITIONER

## 2023-01-19 PROCEDURE — 36415 COLL VENOUS BLD VENIPUNCTURE: CPT | Performed by: NURSE PRACTITIONER

## 2023-01-19 PROCEDURE — 80048 BASIC METABOLIC PNL TOTAL CA: CPT | Performed by: NURSE PRACTITIONER

## 2023-01-19 RX ORDER — AMLODIPINE BESYLATE 5 MG/1
5 TABLET ORAL DAILY
Qty: 90 TABLET | Refills: 3 | Status: SHIPPED | OUTPATIENT
Start: 2023-01-19 | End: 2024-01-19

## 2023-01-19 RX ORDER — METRONIDAZOLE 7.5 MG/G
GEL TOPICAL
Qty: 45 G | Refills: 5 | Status: SHIPPED | OUTPATIENT
Start: 2023-01-19 | End: 2024-02-09

## 2023-01-19 RX ORDER — MONTELUKAST SODIUM 10 MG/1
TABLET ORAL
Qty: 90 TABLET | Refills: 3 | Status: SHIPPED | OUTPATIENT
Start: 2023-01-19 | End: 2024-02-09

## 2023-01-19 RX ORDER — LOSARTAN POTASSIUM 25 MG/1
25 TABLET ORAL DAILY
Qty: 90 TABLET | Refills: 3 | Status: SHIPPED | OUTPATIENT
Start: 2023-01-19 | End: 2024-02-09

## 2023-01-19 ASSESSMENT — PAIN SCALES - GENERAL: PAINLEVEL: NO PAIN (0)

## 2023-01-19 ASSESSMENT — ASTHMA QUESTIONNAIRES: ACT_TOTALSCORE: 24

## 2023-01-19 NOTE — LETTER
January 19, 2023      Alayna VERO Shimon  [address]    [place] [address]        Dear ,    We are writing to inform you of your test results.    Lab results normal, except for slightly elevated glucose level, recommend to follow low carbs diet.       Resulted Orders   Basic metabolic panel  (Ca, Cl, CO2, Creat, Gluc, K, Na, BUN)   Result Value Ref Range    Sodium 138 136 - 145 mmol/L    Potassium 4.6 3.4 - 5.3 mmol/L    Chloride 103 98 - 107 mmol/L    Carbon Dioxide (CO2) 28 22 - 29 mmol/L    Anion Gap 7 7 - 15 mmol/L    Urea Nitrogen 17.7 8.0 - 23.0 mg/dL    Creatinine 0.71 0.51 - 0.95 mg/dL    Calcium 9.2 8.8 - 10.2 mg/dL    Glucose 110 (H) 70 - 99 mg/dL    GFR Estimate 86 >60 mL/min/1.73m2      Comment:      Effective December 21, 2021 eGFRcr in adults is calculated using the 2021 CKD-EPI creatinine equation which includes age and gender (Karla et al., NEJM, DOI: 10.1056/VBOFox0783150)       If you have any questions or concerns, please call the clinic at the number listed above.       Sincerely,      BERTA Chand CNP

## 2023-01-19 NOTE — PROGRESS NOTES
Assessment & Plan     Benign essential hypertension  -well controlled   - amLODIPine (NORVASC) 5 MG tablet; Take 1 tablet (5 mg) by mouth daily  - losartan (COZAAR) 25 MG tablet; Take 1 tablet (25 mg) by mouth daily  - Basic metabolic panel  (Ca, Cl, CO2, Creat, Gluc, K, Na, BUN); Future  - Basic metabolic panel  (Ca, Cl, CO2, Creat, Gluc, K, Na, BUN)    Mild intermittent extrinsic asthma without complication  -stable, well controlled   - montelukast (SINGULAIR) 10 MG tablet; TAKE ONE TABLET BY MOUTH AT BEDTIME    Rosacea    - metroNIDAZOLE (METROGEL) 0.75 % external gel; APPLY TO AFFECTED AREA(S) TWICE DAILY    Chronic pain of both shoulders  -recommended physical therapy, patient would like to start physical therapy 1-2 months later since she is currently taking care of her son who recently had stroke   - Physical Therapy Referral; Future        Return in about 1 year (around 1/19/2024) for Routine Visit.    BERTA Troncoso [facility]    Orion Catalan is a 77 year old female patient, presenting for the following health issues:  Refill Request      HPI     Hypertension Follow-up      Do you check your blood pressure regularly outside of the clinic? Yes at home     Are you following a low salt diet? Yes    Are your blood pressures ever more than 140 on the top number (systolic) OR more   than 90 on the bottom number (diastolic), for example 140/90? No      How many servings of fruits and vegetables do you eat daily?  2-3    On average, how many sweetened beverages do you drink each day (Examples: soda, juice, sweet tea, etc.  Do NOT count diet or artificially sweetened beverages)?   0     How many days per week do you exercise enough to make your heart beat faster? 3 or less    How many minutes a day do you exercise enough to make your heart beat faster? 9 or less    How many days per week do you miss taking your medication? 0        Review of Systems   Constitutional, HEENT,  "cardiovascular, pulmonary, gi and gu systems are negative, except as otherwise noted.      Objective    /84 (BP Location: Left arm, Patient Position: Sitting, Cuff Size: Adult Regular)   Pulse 97   Temp 97.6  F (36.4  C) (Tympanic)   Resp 16   Ht 1.518 m (4' 11.75\")   Wt 72.7 kg (160 lb 4.8 oz)   SpO2 98%   BMI 31.57 kg/m    Body mass index is 31.57 kg/m .  Physical Exam   GENERAL: healthy, alert and no distress  EYES: Eyes grossly normal to inspection, PERRL and conjunctivae and sclerae normal  NECK: no adenopathy, no asymmetry, masses, or scars and thyroid normal to palpation  RESP: lungs clear to auscultation - no rales, rhonchi or wheezes  CV: regular rate and rhythm, normal S1 S2, no S3 or S4, no murmur, click or rub, no peripheral edema and peripheral pulses strong  SKIN: no suspicious lesions or rashes  NEURO: Normal strength and tone, mentation intact and speech normal  PSYCH: mentation appears normal, affect normal/bright                "

## 2023-02-20 ENCOUNTER — OFFICE VISIT (OUTPATIENT)
Dept: FAMILY MEDICINE | Facility: CLINIC | Age: 78
End: 2023-02-20
Payer: COMMERCIAL

## 2023-02-20 VITALS
HEART RATE: 77 BPM | WEIGHT: 158.9 LBS | OXYGEN SATURATION: 96 % | SYSTOLIC BLOOD PRESSURE: 137 MMHG | HEIGHT: 60 IN | TEMPERATURE: 97.6 F | DIASTOLIC BLOOD PRESSURE: 81 MMHG | BODY MASS INDEX: 31.2 KG/M2

## 2023-02-20 DIAGNOSIS — Z01.818 PREOP GENERAL PHYSICAL EXAM: Primary | ICD-10-CM

## 2023-02-20 DIAGNOSIS — H02.403 PTOSIS OF BOTH EYELIDS: ICD-10-CM

## 2023-02-20 PROCEDURE — 99214 OFFICE O/P EST MOD 30 MIN: CPT | Performed by: NURSE PRACTITIONER

## 2023-02-20 NOTE — PATIENT INSTRUCTIONS
For informational purposes only. Not to replace the advice of your health care provider. Copyright   2003,  [facility]. All rights reserved. Clinically reviewed by Nikki Lorenzana MD. [employer] [id number] - REV .  Preparing for Your Surgery  Getting started  A nurse will call you to review your health history and instructions. They will give you an arrival time based on your scheduled surgery time. Please be ready to share:    Your doctor's clinic name and phone number    Your medical, surgical, and anesthesia history    A list of allergies and sensitivities    A list of medicines, including herbal treatments and over-the-counter drugs    Whether the patient has a legal guardian (ask how to send us the papers in advance)  Please tell us if you're pregnant--or if there's any chance you might be pregnant. Some surgeries may injure a fetus (unborn baby), so they require a pregnancy test. Surgeries that are safe for a fetus don't always need a test, and you can choose whether to have one.   If you have a child who's having surgery, please ask for a copy of Preparing for Your Child's Surgery.    Preparing for surgery    Within 10 to 30 days of surgery: Have a pre-op exam (sometimes called an H&P, or History and Physical). This can be done at a clinic or pre-operative center.  ? If you're having a , you may not need this exam. Talk to your care team.    At your pre-op exam, talk to your care team about all medicines you take. If you need to stop any medicines before surgery, ask when to start taking them again.  ? We do this for your safety. Many medicines can make you bleed too much during surgery. Some change how well surgery (anesthesia) drugs work.    Call your insurance company to let them know you're having surgery. (If you don't have insurance, call [phone].)    Call your clinic if there's any change in your health. This includes signs of a cold or flu (sore throat, runny nose,  cough, rash, fever). It also includes a scrape or scratch near the surgery site.    If you have questions on the day of surgery, call your hospital or surgery center.  Eating and drinking guidelines  For your safety: Unless your surgeon tells you otherwise, follow the guidelines below.    Eat and drink as usual until 8 hours before you arrive for surgery. After that, no food or milk.    Drink clear liquids until 2 hours before you arrive. These are liquids you can see through, like water, Gatorade, and Propel Water. They also include plain black coffee and tea (no cream or milk), candy, and breath mints. You can spit out gum when you arrive.    If you drink alcohol: Stop drinking it the night before surgery.    If your care team tells you to take medicine on the morning of surgery, it's okay to take it with a sip of water.  Preventing infection    Shower or bathe the night before and morning of your surgery. Follow the instructions your clinic gave you. (If no instructions, use regular soap.)    Don't shave or clip hair near your surgery site. We'll remove the hair if needed.    Don't smoke or vape the morning of surgery. You may chew nicotine gum up to 2 hours before surgery. A nicotine patch is okay.  ? Note: Some surgeries require you to completely quit smoking and nicotine. Check with your surgeon.    Your care team will make every effort to keep you safe from infection. We will:  ? Clean our hands often with soap and water (or an alcohol-based hand rub).  ? Clean the skin at your surgery site with a special soap that kills germs.  ? Give you a special gown to keep you warm. (Cold raises the risk of infection.)  ? Wear special hair covers, masks, gowns and gloves during surgery.  ? Give antibiotic medicine, if prescribed. Not all surgeries need antibiotics.  What to bring on the day of surgery    Photo ID and insurance card    Copy of your health care directive, if you have one    Glasses and hearing aids (bring  cases)  ? You can't wear contacts during surgery    Inhaler and eye drops, if you use them (tell us about these when you arrive)    CPAP machine or breathing device, if you use them    A few personal items, if spending the night    If you have . . .  ? A pacemaker, ICD (cardiac defibrillator) or other implant: Bring the ID card.  ? An implanted stimulator: Bring the remote control.  ? A legal guardian: Bring a copy of the certified (court-stamped) guardianship papers.  Please remove any jewelry, including body piercings. Leave jewelry and other valuables at home.  If you're going home the day of surgery    You must have a responsible adult drive you home. They should stay with you overnight as well.    If you don't have someone to stay with you, and you aren't safe to go home alone, we may keep you overnight. Insurance often won't pay for this.  After surgery  If it's hard to control your pain or you need more pain medicine, please call your surgeon's office.  Questions?   If you have any questions for your care team, list them here: _________________________________________________________________________________________________________________________________________________________________________ ____________________________________ ____________________________________ ____________________________________

## 2023-02-20 NOTE — NURSING NOTE
"Initial /81   Pulse 77   Temp 97.6  F (36.4  C) (Tympanic)   Ht 1.518 m (4' 11.75\")   Wt 72.1 kg (158 lb 14.4 oz)   SpO2 96%   BMI 31.29 kg/m   Estimated body mass index is 31.29 kg/m  as calculated from the following:    Height as of this encounter: 1.518 m (4' 11.75\").    Weight as of this encounter: 72.1 kg (158 lb 14.4 oz). .      "

## 2023-02-20 NOTE — PROGRESS NOTES
[facility]  [address] [address]  Phone: [phone]  Primary Provider: Komal Lynch  Pre-op Performing Provider: RILEY GONZALEZ      PREOPERATIVE EVALUATION:  Today's date: 2/20/2023    Violet Robins is a 77 year old female who presents for a preoperative evaluation.    Surgical Information:  Surgery/Procedure: bilateral eye lid lift   Surgery Location: [facility]  Surgeon: Dr Gooden  Surgery Date: 03/02/23  Time of Surgery: tbd  Where patient plans to recover: At home with family  Fax number for surgical facility: [phone]    Type of Anesthesia Anticipated: to be determined    Assessment & Plan     The proposed surgical procedure is considered LOW risk.    Preop general physical exam  Ptosis of both eyelids        Risks and Recommendations:  The patient has the following additional risks and recommendations for perioperative complications:   - No identified additional risk factors other than previously addressed    Medication Instructions:  Patient is to take all scheduled medications on the day of surgery EXCEPT for modifications listed below:   - aspirin: Discontinue aspirin 7-10 days prior to procedure to reduce bleeding risk. It should be resumed postoperatively.     RECOMMENDATION:  APPROVAL GIVEN to proceed with proposed procedure, without further diagnostic evaluation.              Subjective     HPI related to upcoming procedure:   Drooping upper eyelids affecting vision      Preop Questions 2/20/2023   1. Have you ever had a heart attack or stroke? No   2. Have you ever had surgery on your heart or blood vessels, such as a stent placement, a coronary artery bypass, or surgery on an artery in your head, neck, heart, or legs? No   3. Do you have chest pain with activity? No   4. Do you have a history of  heart failure? No   5. Do you currently have a cold, bronchitis or symptoms of other infection? No   6. Do you have a  cough, shortness of breath, or wheezing? No   7. Do you or anyone in your family have previous history of blood clots? YES - son had a stroke   8. Do you or does anyone in your family have a serious bleeding problem such as prolonged bleeding following surgeries or cuts? No   9. Have you ever had problems with anemia or been told to take iron pills? No   10. Have you had any abnormal blood loss such as black, tarry or bloody stools, or abnormal vaginal bleeding? No   11. Have you ever had a blood transfusion? No   12. Are you willing to have a blood transfusion if it is medically needed before, during, or after your surgery? Yes   13. Have you or any of your relatives ever had problems with anesthesia? No   14. Do you have sleep apnea, excessive snoring or daytime drowsiness? No   15. Do you have any artifical heart valves or other implanted medical devices like a pacemaker, defibrillator, or continuous glucose monitor? No   16. Do you have artificial joints? No   17. Are you allergic to latex? YES     Health Care Directive:  Patient does not have a Health Care Directive or Living Will: Discussed advance care planning with patient; however, patient declined at this time.      Status of Chronic Conditions:  See problem list for active medical problems.  Problems all longstanding and stable, except as noted/documented.  See ROS for pertinent symptoms related to these conditions.      Review of Systems  CONSTITUTIONAL: NEGATIVE for fever, chills, change in weight  INTEGUMENTARY/SKIN: NEGATIVE for worrisome rashes, moles or lesions  EYES: NEGATIVE for vision changes or irritation  ENT/MOUTH: NEGATIVE for ear, mouth and throat problems  RESP: NEGATIVE for significant cough or SOB  CV: NEGATIVE for chest pain, palpitations or peripheral edema  GI: NEGATIVE for nausea, abdominal pain, heartburn, or change in bowel habits  : NEGATIVE for frequency, dysuria, or hematuria  MUSCULOSKELETAL: NEGATIVE for significant  arthralgias or myalgia  NEURO: NEGATIVE for weakness, dizziness or paresthesias  ENDOCRINE: NEGATIVE for temperature intolerance, skin/hair changes  HEME: NEGATIVE for bleeding problems  PSYCHIATRIC: NEGATIVE for changes in mood or affect    Patient Active Problem List    Diagnosis Date Noted     Allergic asthma with stated cause 03/09/2020     Priority: Medium     CARDIOVASCULAR SCREENING; LDL GOAL LESS THAN 130 01/16/2014     Priority: Medium     DDD (degenerative disc disease), cervical 01/08/2014     Priority: Medium     Routine general medical examination at a health care facility 01/08/2014     Priority: Medium     [facility]       CA in situ breast 08/17/2011     Priority: Medium     Essential hypertension 06/07/2003     Priority: Medium     lisinopril 10 mg daily  ; Hypertension     Last Assessment & Plan:   Patient has hypertension currently stable on lisinopril 10 mg. Blood pressure today is 136/82.   No side effects noted from medications.  She denies chest pain or shortness of breath.  Patient denies headaches or neurological symptoms. She is not experiencing edema, orthopnea, or dyspnea on exertion.        Past Medical History:   Diagnosis Date     Breast cancer (H) 2009    right     Past Surgical History:   Procedure Laterality Date     BIOPSY BREAST       LUMPECTOMY BREAST Right 2009    with radiation     Current Outpatient Medications   Medication Sig Dispense Refill     amLODIPine (NORVASC) 5 MG tablet Take 1 tablet (5 mg) by mouth daily 90 tablet 3     ascorbic acid 500 MG TABS Take 1-2 mg by mouth every 24 hours       aspirin (ASA) 81 MG tablet Take 1 tablet by mouth every 24 hours       Calcium Carbonate-Vitamin D (CALCIUM + D PO) Take  by mouth.       Cholecalciferol (VITAMIN D) 1000 UNITS capsule Take 1 capsule by mouth daily       Fexofenadine HCl (MUCINEX ALLERGY PO) Take by mouth 2 times daily       Glucosamine-Chondroit-Vit C-Mn (GLUCOSAMINE 1500 COMPLEX) CAPS Take 1 capsule by mouth  "daily        losartan (COZAAR) 25 MG tablet Take 1 tablet (25 mg) by mouth daily 90 tablet 3     metroNIDAZOLE (METROGEL) 0.75 % external gel APPLY TO AFFECTED AREA(S) TWICE DAILY 45 g 5     montelukast (SINGULAIR) 10 MG tablet TAKE ONE TABLET BY MOUTH AT BEDTIME 90 tablet 3     Multiple Vitamin (MULTIVITAMIN OR) Take 1 tablet by mouth daily        VITAMIN E Take 1 capsule by mouth daily       albuterol (PROAIR HFA/PROVENTIL HFA/VENTOLIN HFA) 108 (90 Base) MCG/ACT inhaler Inhale 1-2 puffs into the lungs every 6 hours as needed for shortness of breath / dyspnea or wheezing (Patient not taking: Reported on 2/20/2023) 18 g 3       Allergies   Allergen Reactions     Bee Venom Hives and Swelling     PN: swelling and then went to cellulitis     Oxycontin [Kdc:Ci Pigment Blue 63+Oxycodone] Nausea and Vomiting     Codeine Nausea and Vomiting     Latex Other (See Comments)     PN: Converted from LW Latex Sensitivity Flag     Lisinopril Cough     Tramadol Nausea and Vomiting        Social History     Tobacco Use     Smoking status: Never     Smokeless tobacco: Never   Substance Use Topics     Alcohol use: Not Currently       History   Drug Use Unknown         Objective     /81   Pulse 77   Temp 97.6  F (36.4  C) (Tympanic)   Ht 1.518 m (4' 11.75\")   Wt 72.1 kg (158 lb 14.4 oz)   SpO2 96%   BMI 31.29 kg/m      Physical Exam    GENERAL APPEARANCE: healthy, alert and no distress     HENT: ear canals and TM's normal and nose and mouth without ulcers or lesions     NECK: no adenopathy, no asymmetry, masses, or scars and thyroid normal to palpation     RESP: lungs clear to auscultation - no rales, rhonchi or wheezes     CV: regular rates and rhythm, normal S1 S2, no S3 or S4 and no murmur, click or rub     ABDOMEN:  soft, nontender, no HSM or masses and bowel sounds normal     MS: extremities normal- no gross deformities noted, no evidence of inflammation in joints, FROM in all extremities.     SKIN: no suspicious " lesions or rashes     NEURO: Normal strength and tone, sensory exam grossly normal, mentation intact and speech normal     PSYCH: mentation appears normal. and affect normal/bright     LYMPHATICS: No cervical adenopathy    Recent Labs   Lab Test 01/19/23  0859 12/15/21  0917    142   POTASSIUM 4.6 3.8   CR 0.71 0.65        Diagnostics:  No labs were ordered during this visit.   No EKG required, no history of coronary heart disease, significant arrhythmia, peripheral arterial disease or other structural heart disease.    Revised Cardiac Risk Index (RCRI):  The patient has the following serious cardiovascular risks for perioperative complications:   - No serious cardiac risks = 0 points     RCRI Interpretation: 0 points: Class I (very low risk - 0.4% complication rate)           Signed Electronically by: BERTA Jung CNP  Copy of this evaluation report is provided to requesting physician.

## 2023-05-24 ENCOUNTER — OFFICE VISIT (OUTPATIENT)
Dept: FAMILY MEDICINE | Facility: CLINIC | Age: 78
End: 2023-05-24
Payer: COMMERCIAL

## 2023-05-24 VITALS
WEIGHT: 161.3 LBS | TEMPERATURE: 98.2 F | HEART RATE: 93 BPM | SYSTOLIC BLOOD PRESSURE: 132 MMHG | OXYGEN SATURATION: 96 % | HEIGHT: 60 IN | DIASTOLIC BLOOD PRESSURE: 82 MMHG | BODY MASS INDEX: 31.67 KG/M2 | RESPIRATION RATE: 16 BRPM

## 2023-05-24 DIAGNOSIS — Z01.818 PREOP GENERAL PHYSICAL EXAM: Primary | ICD-10-CM

## 2023-05-24 DIAGNOSIS — I10 ESSENTIAL HYPERTENSION: ICD-10-CM

## 2023-05-24 DIAGNOSIS — R73.09 ELEVATED GLUCOSE LEVEL: ICD-10-CM

## 2023-05-24 DIAGNOSIS — H02.403 PTOSIS OF BOTH EYELIDS: ICD-10-CM

## 2023-05-24 LAB
ANION GAP SERPL CALCULATED.3IONS-SCNC: 10 MMOL/L (ref 7–15)
BUN SERPL-MCNC: 16.8 MG/DL (ref 8–23)
CALCIUM SERPL-MCNC: 9.6 MG/DL (ref 8.8–10.2)
CHLORIDE SERPL-SCNC: 103 MMOL/L (ref 98–107)
CREAT SERPL-MCNC: 0.74 MG/DL (ref 0.51–0.95)
DEPRECATED HCO3 PLAS-SCNC: 26 MMOL/L (ref 22–29)
GFR SERPL CREATININE-BSD FRML MDRD: 81 ML/MIN/1.73M2
GLUCOSE SERPL-MCNC: 117 MG/DL (ref 70–99)
HBA1C MFR BLD: 5.5 % (ref 0–5.6)
POTASSIUM SERPL-SCNC: 4.2 MMOL/L (ref 3.4–5.3)
SODIUM SERPL-SCNC: 139 MMOL/L (ref 136–145)

## 2023-05-24 PROCEDURE — 36415 COLL VENOUS BLD VENIPUNCTURE: CPT | Performed by: NURSE PRACTITIONER

## 2023-05-24 PROCEDURE — 83036 HEMOGLOBIN GLYCOSYLATED A1C: CPT | Performed by: NURSE PRACTITIONER

## 2023-05-24 PROCEDURE — 99214 OFFICE O/P EST MOD 30 MIN: CPT | Performed by: NURSE PRACTITIONER

## 2023-05-24 PROCEDURE — 80048 BASIC METABOLIC PNL TOTAL CA: CPT | Performed by: NURSE PRACTITIONER

## 2023-05-24 ASSESSMENT — PAIN SCALES - GENERAL: PAINLEVEL: NO PAIN (0)

## 2023-05-24 NOTE — LETTER
May 25, 2023      Alayna VERO Shimon  [address] [facility]    [place] [address]        Dear ,    We are writing to inform you of your test results.    The lab results are normal, please inform patient.     BERTA Troncoso CNP   Your test results fall within the expected range(s) or remain unchanged from previous results.  Please continue with current treatment plan.    Resulted Orders   Hemoglobin A1c   Result Value Ref Range    Hemoglobin A1C 5.5 0.0 - 5.6 %      Comment:      Normal <5.7%   Prediabetes 5.7-6.4%    Diabetes 6.5% or higher     Note: Adopted from ADA consensus guidelines.   Basic metabolic panel  (Ca, Cl, CO2, Creat, Gluc, K, Na, BUN)   Result Value Ref Range    Sodium 139 136 - 145 mmol/L    Potassium 4.2 3.4 - 5.3 mmol/L    Chloride 103 98 - 107 mmol/L    Carbon Dioxide (CO2) 26 22 - 29 mmol/L    Anion Gap 10 7 - 15 mmol/L    Urea Nitrogen 16.8 8.0 - 23.0 mg/dL    Creatinine 0.74 0.51 - 0.95 mg/dL    Calcium 9.6 8.8 - 10.2 mg/dL    Glucose 117 (H) 70 - 99 mg/dL    GFR Estimate 81 >60 mL/min/1.73m2      Comment:      eGFR calculated using 2021 CKD-EPI equation.       If you have any questions or concerns, please call the clinic at the number listed above.       Sincerely,      BERTA Chand CNP

## 2023-05-24 NOTE — PROGRESS NOTES
[facility]  [address] [address]  Phone: [phone]  Primary Provider: Komal Lynch  Pre-op Performing Provider: KOMAL LYNCH      PREOPERATIVE EVALUATION:  Today's date: 5/24/2023    Violet Robins is a 78 year old female who presents for a preoperative evaluation.      5/24/2023     1:56 PM   Additional Questions   Roomed by Mariella         5/24/2023     1:56 PM   Patient Reported Additional Medications   Patient reports taking the following new medications none     Surgical Information:  Surgery/Procedure: eye lid lift   Surgery Location: [facility]   Surgeon: DR Gooden   Surgery Date: 6/2  Time of Surgery: TBD  Where patient plans to recover: At home with family  Fax number for surgical facility: [phone]     Assessment & Plan     The proposed surgical procedure is considered INTERMEDIATE risk.    Preop general physical exam  -patient is cleared for bilateral eyelid surgery     Ptosis of both eyelids  -cleared for surgery     Essential hypertension  -well controlled    Elevated glucose level  -A1C within normal range   - Basic metabolic panel  (Ca, Cl, CO2, Creat, Gluc, K, Na, BUN); Future  - Hemoglobin A1c; Future  - Hemoglobin A1c  - Basic metabolic panel  (Ca, Cl, CO2, Creat, Gluc, K, Na, BUN)         - No identified additional risk factors other than previously addressed    Antiplatelet or Anticoagulation Medication Instructions:   - Patient is on no antiplatelet or anticoagulation medications.    Additional Medication Instructions:  Patient is to take all scheduled medications on the day of surgery    RECOMMENDATION:  APPROVAL GIVEN to proceed with proposed procedure, without further diagnostic evaluation.    Subjective       HPI related to upcoming procedure: bilateral ptosis, scheduled for bilateral eyelids surgery         5/24/2023     2:03 PM   Preop Questions   1. Have you ever had a heart attack or stroke?  No   2. Have you ever had surgery on your heart or blood vessels, such as a stent placement, a coronary artery bypass, or surgery on an artery in your head, neck, heart, or legs? No   3. Do you have chest pain with activity? No   4. Do you have a history of  heart failure? No   5. Do you currently have a cold, bronchitis or symptoms of other infection? No   6. Do you have a cough, shortness of breath, or wheezing? No   7. Do you or anyone in your family have previous history of blood clots? No   8. Do you or does anyone in your family have a serious bleeding problem such as prolonged bleeding following surgeries or cuts? No   9. Have you ever had problems with anemia or been told to take iron pills? No   10. Have you had any abnormal blood loss such as black, tarry or bloody stools, or abnormal vaginal bleeding? No   11. Have you ever had a blood transfusion? No   12. Are you willing to have a blood transfusion if it is medically needed before, during, or after your surgery? Yes   13. Have you or any of your relatives ever had problems with anesthesia? No   14. Do you have sleep apnea, excessive snoring or daytime drowsiness? No   15. Do you have any artifical heart valves or other implanted medical devices like a pacemaker, defibrillator, or continuous glucose monitor? No   16. Do you have artificial joints? No   17. Are you allergic to latex? YES           Preoperative Review of :   reviewed - no record of controlled substances prescribed.      Status of Chronic Conditions:  See problem list for active medical problems.  Problems all longstanding and stable, except as noted/documented.  See ROS for pertinent symptoms related to these conditions.      Review of Systems  Constitutional, neuro, ENT, endocrine, pulmonary, cardiac, gastrointestinal, genitourinary, musculoskeletal, integument and psychiatric systems are negative, except as otherwise noted.    Patient Active Problem List    Diagnosis Date Noted      Allergic asthma with stated cause 03/09/2020     Priority: Medium     CARDIOVASCULAR SCREENING; LDL GOAL LESS THAN 130 01/16/2014     Priority: Medium     DDD (degenerative disc disease), cervical 01/08/2014     Priority: Medium     Routine general medical examination at a health care facility 01/08/2014     Priority: Medium     [facility]       CA in situ breast 08/17/2011     Priority: Medium     Essential hypertension 06/07/2003     Priority: Medium     lisinopril 10 mg daily  ; Hypertension     Last Assessment & Plan:   Patient has hypertension currently stable on lisinopril 10 mg. Blood pressure today is 136/82.   No side effects noted from medications.  She denies chest pain or shortness of breath.  Patient denies headaches or neurological symptoms. She is not experiencing edema, orthopnea, or dyspnea on exertion.        Past Medical History:   Diagnosis Date     Breast cancer (H) 2009    right     Past Surgical History:   Procedure Laterality Date     BIOPSY BREAST       LUMPECTOMY BREAST Right 2009    with radiation     Current Outpatient Medications   Medication Sig Dispense Refill     amLODIPine (NORVASC) 5 MG tablet Take 1 tablet (5 mg) by mouth daily 90 tablet 3     ascorbic acid 500 MG TABS Take 1-2 mg by mouth every 24 hours       aspirin (ASA) 81 MG tablet Take 1 tablet by mouth every 24 hours       Calcium Carbonate-Vitamin D (CALCIUM + D PO) Take  by mouth.       Cholecalciferol (VITAMIN D) 1000 UNITS capsule Take 1 capsule by mouth daily       Fexofenadine HCl (MUCINEX ALLERGY PO) Take by mouth 2 times daily       Glucosamine-Chondroit-Vit C-Mn (GLUCOSAMINE 1500 COMPLEX) CAPS Take 1 capsule by mouth daily        losartan (COZAAR) 25 MG tablet Take 1 tablet (25 mg) by mouth daily 90 tablet 3     metroNIDAZOLE (METROGEL) 0.75 % external gel APPLY TO AFFECTED AREA(S) TWICE DAILY 45 g 5     montelukast (SINGULAIR) 10 MG tablet TAKE ONE TABLET BY MOUTH AT BEDTIME 90 tablet 3     Multiple Vitamin  "(MULTIVITAMIN OR) Take 1 tablet by mouth daily        VITAMIN E Take 1 capsule by mouth daily       albuterol (PROAIR HFA/PROVENTIL HFA/VENTOLIN HFA) 108 (90 Base) MCG/ACT inhaler Inhale 1-2 puffs into the lungs every 6 hours as needed for shortness of breath / dyspnea or wheezing (Patient not taking: Reported on 2/20/2023) 18 g 3       Allergies   Allergen Reactions     Bee Venom Hives and Swelling     PN: swelling and then went to cellulitis     Oxycontin [Oxycodone Hcl] Nausea and Vomiting     Codeine Nausea and Vomiting     Latex Other (See Comments)     PN: Converted from LW Latex Sensitivity Flag     Lisinopril Cough     Tramadol Nausea and Vomiting        Social History     Tobacco Use     Smoking status: Never     Smokeless tobacco: Never   Vaping Use     Vaping status: Never Used   Substance Use Topics     Alcohol use: Not Currently       History   Drug Use Unknown         Objective     /82 (BP Location: Left arm, Patient Position: Sitting, Cuff Size: Adult Regular)   Pulse 93   Temp 98.2  F (36.8  C) (Tympanic)   Resp 16   Ht 1.511 m (4' 11.5\")   Wt 73.2 kg (161 lb 4.8 oz)   SpO2 96%   BMI 32.03 kg/m      Physical Exam    GENERAL APPEARANCE: healthy, alert and no distress     EYES: EOMI, PERRL     NECK: no adenopathy, no asymmetry, masses, or scars and thyroid normal to palpation     RESP: lungs clear to auscultation - no rales, rhonchi or wheezes     CV: regular rates and rhythm, normal S1 S2, no S3 or S4 and no murmur, click or rub     MS: extremities normal- no gross deformities noted, no evidence of inflammation in joints, FROM in all extremities.     SKIN: no suspicious lesions or rashes     NEURO: Normal strength and tone, sensory exam grossly normal, mentation intact and speech normal     PSYCH: mentation appears normal. and affect normal/bright     LYMPHATICS: No cervical adenopathy    Recent Labs   Lab Test 01/19/23  0859 12/15/21  0917    142   POTASSIUM 4.6 3.8   CR 0.71 0.65 "        Diagnostics:  Recent Results (from the past 24 hour(s))   Hemoglobin A1c    Collection Time: 05/24/23  2:21 PM   Result Value Ref Range    Hemoglobin A1C 5.5 0.0 - 5.6 %      No EKG required, no history of coronary heart disease, significant arrhythmia, peripheral arterial disease or other structural heart disease.    Revised Cardiac Risk Index (RCRI):  The patient has the following serious cardiovascular risks for perioperative complications:   - No serious cardiac risks = 0 points     RCRI Interpretation: 0 points: Class I (very low risk - 0.4% complication rate)           Signed Electronically by: BERTA Troncoso CNP  Copy of this evaluation report is provided to requesting physician.

## 2023-08-01 ENCOUNTER — TRANSFERRED RECORDS (OUTPATIENT)
Dept: HEALTH INFORMATION MANAGEMENT | Facility: CLINIC | Age: 78
End: 2023-08-01
Payer: COMMERCIAL

## 2023-11-15 ENCOUNTER — ANCILLARY ORDERS (OUTPATIENT)
Dept: FAMILY MEDICINE | Facility: CLINIC | Age: 78
End: 2023-11-15

## 2023-11-15 DIAGNOSIS — Z12.31 VISIT FOR SCREENING MAMMOGRAM: Primary | ICD-10-CM

## 2023-12-07 ENCOUNTER — HOSPITAL ENCOUNTER (OUTPATIENT)
Dept: MAMMOGRAPHY | Facility: CLINIC | Age: 78
Discharge: HOME | End: 2023-12-07
Attending: NURSE PRACTITIONER | Admitting: NURSE PRACTITIONER
Payer: MEDICARE

## 2023-12-07 DIAGNOSIS — Z12.31 VISIT FOR SCREENING MAMMOGRAM: ICD-10-CM

## 2023-12-07 PROCEDURE — 77067 SCR MAMMO BI INCL CAD: CPT

## 2024-02-08 RX ORDER — RESPIRATORY SYNCYTIAL VIRUS VACCINE 120MCG/0.5
0.5 KIT INTRAMUSCULAR ONCE
Qty: 1 EACH | Refills: 0 | Status: CANCELLED | OUTPATIENT
Start: 2024-02-08 | End: 2024-02-08

## 2024-02-09 ENCOUNTER — OFFICE VISIT (OUTPATIENT)
Dept: FAMILY MEDICINE | Facility: CLINIC | Age: 79
End: 2024-02-09
Payer: COMMERCIAL

## 2024-02-09 VITALS
SYSTOLIC BLOOD PRESSURE: 128 MMHG | HEART RATE: 96 BPM | BODY MASS INDEX: 32.12 KG/M2 | RESPIRATION RATE: 16 BRPM | WEIGHT: 163.6 LBS | DIASTOLIC BLOOD PRESSURE: 84 MMHG | HEIGHT: 60 IN | OXYGEN SATURATION: 97 % | TEMPERATURE: 97.8 F

## 2024-02-09 DIAGNOSIS — M25.512 CHRONIC PAIN OF BOTH SHOULDERS: ICD-10-CM

## 2024-02-09 DIAGNOSIS — L71.9 ROSACEA: ICD-10-CM

## 2024-02-09 DIAGNOSIS — I83.893 SYMPTOMATIC VARICOSE VEINS OF BOTH LOWER EXTREMITIES: ICD-10-CM

## 2024-02-09 DIAGNOSIS — R00.0 TACHYCARDIA: ICD-10-CM

## 2024-02-09 DIAGNOSIS — I10 BENIGN ESSENTIAL HYPERTENSION: ICD-10-CM

## 2024-02-09 DIAGNOSIS — Z00.00 ANNUAL PHYSICAL EXAM: Primary | ICD-10-CM

## 2024-02-09 DIAGNOSIS — J45.20 MILD INTERMITTENT EXTRINSIC ASTHMA WITHOUT COMPLICATION: ICD-10-CM

## 2024-02-09 DIAGNOSIS — G89.29 CHRONIC PAIN OF BOTH SHOULDERS: ICD-10-CM

## 2024-02-09 DIAGNOSIS — M25.511 CHRONIC PAIN OF BOTH SHOULDERS: ICD-10-CM

## 2024-02-09 LAB
ANION GAP SERPL CALCULATED.3IONS-SCNC: 8 MMOL/L (ref 7–15)
BUN SERPL-MCNC: 18.6 MG/DL (ref 8–23)
CALCIUM SERPL-MCNC: 9.4 MG/DL (ref 8.8–10.2)
CHLORIDE SERPL-SCNC: 104 MMOL/L (ref 98–107)
CHOLEST SERPL-MCNC: 185 MG/DL
CREAT SERPL-MCNC: 0.74 MG/DL (ref 0.51–0.95)
DEPRECATED HCO3 PLAS-SCNC: 30 MMOL/L (ref 22–29)
EGFRCR SERPLBLD CKD-EPI 2021: 81 ML/MIN/1.73M2
FASTING STATUS PATIENT QL REPORTED: YES
GLUCOSE SERPL-MCNC: 107 MG/DL (ref 70–99)
HDLC SERPL-MCNC: 72 MG/DL
LDLC SERPL CALC-MCNC: 99 MG/DL
NONHDLC SERPL-MCNC: 113 MG/DL
POTASSIUM SERPL-SCNC: 4.4 MMOL/L (ref 3.4–5.3)
SODIUM SERPL-SCNC: 142 MMOL/L (ref 135–145)
TRIGL SERPL-MCNC: 71 MG/DL
TSH SERPL DL<=0.005 MIU/L-ACNC: 2.63 UIU/ML (ref 0.3–4.2)

## 2024-02-09 PROCEDURE — 80048 BASIC METABOLIC PNL TOTAL CA: CPT | Performed by: NURSE PRACTITIONER

## 2024-02-09 PROCEDURE — 80061 LIPID PANEL: CPT | Performed by: NURSE PRACTITIONER

## 2024-02-09 PROCEDURE — G0439 PPPS, SUBSEQ VISIT: HCPCS | Performed by: NURSE PRACTITIONER

## 2024-02-09 PROCEDURE — 84443 ASSAY THYROID STIM HORMONE: CPT | Performed by: NURSE PRACTITIONER

## 2024-02-09 PROCEDURE — 99214 OFFICE O/P EST MOD 30 MIN: CPT | Mod: 25 | Performed by: NURSE PRACTITIONER

## 2024-02-09 PROCEDURE — 36415 COLL VENOUS BLD VENIPUNCTURE: CPT | Performed by: NURSE PRACTITIONER

## 2024-02-09 RX ORDER — METRONIDAZOLE 7.5 MG/G
GEL TOPICAL
Qty: 45 G | Refills: 5 | Status: SHIPPED | OUTPATIENT
Start: 2024-02-09

## 2024-02-09 RX ORDER — MONTELUKAST SODIUM 10 MG/1
TABLET ORAL
Qty: 90 TABLET | Refills: 3 | Status: SHIPPED | OUTPATIENT
Start: 2024-02-09

## 2024-02-09 RX ORDER — LOSARTAN POTASSIUM 25 MG/1
25 TABLET ORAL DAILY
Qty: 90 TABLET | Refills: 3 | Status: SHIPPED | OUTPATIENT
Start: 2024-02-09

## 2024-02-09 RX ORDER — AMLODIPINE BESYLATE 5 MG/1
5 TABLET ORAL DAILY
Qty: 90 TABLET | Refills: 3 | Status: SHIPPED | OUTPATIENT
Start: 2024-02-09

## 2024-02-09 SDOH — HEALTH STABILITY: PHYSICAL HEALTH: ON AVERAGE, HOW MANY DAYS PER WEEK DO YOU ENGAGE IN MODERATE TO STRENUOUS EXERCISE (LIKE A BRISK WALK)?: 2 DAYS

## 2024-02-09 ASSESSMENT — ASTHMA QUESTIONNAIRES
ACT_TOTALSCORE: 24
QUESTION_2 LAST FOUR WEEKS HOW OFTEN HAVE YOU HAD SHORTNESS OF BREATH: NOT AT ALL
QUESTION_3 LAST FOUR WEEKS HOW OFTEN DID YOUR ASTHMA SYMPTOMS (WHEEZING, COUGHING, SHORTNESS OF BREATH, CHEST TIGHTNESS OR PAIN) WAKE YOU UP AT NIGHT OR EARLIER THAN USUAL IN THE MORNING: NOT AT ALL
ACT_TOTALSCORE: 24
QUESTION_1 LAST FOUR WEEKS HOW MUCH OF THE TIME DID YOUR ASTHMA KEEP YOU FROM GETTING AS MUCH DONE AT WORK, SCHOOL OR AT HOME: NONE OF THE TIME
QUESTION_5 LAST FOUR WEEKS HOW WOULD YOU RATE YOUR ASTHMA CONTROL: WELL CONTROLLED
QUESTION_4 LAST FOUR WEEKS HOW OFTEN HAVE YOU USED YOUR RESCUE INHALER OR NEBULIZER MEDICATION (SUCH AS ALBUTEROL): NOT AT ALL

## 2024-02-09 ASSESSMENT — PAIN SCALES - GENERAL: PAINLEVEL: NO PAIN (0)

## 2024-02-09 ASSESSMENT — SOCIAL DETERMINANTS OF HEALTH (SDOH): HOW OFTEN DO YOU GET TOGETHER WITH FRIENDS OR RELATIVES?: MORE THAN THREE TIMES A WEEK

## 2024-02-09 NOTE — LETTER
February 9, 2024      Alayna Robins  [address]    [place] [address]        Dear ,    We are writing to inform you of your test results.    Your test results fall within the expected range(s) or remain unchanged from previous results.  Please continue with current treatment plan.  Please inform patient:      Thyroid function normal     BERTA Troncoso CNP       Resulted Orders   Lipid panel reflex to direct LDL Fasting   Result Value Ref Range    Cholesterol 185 <200 mg/dL    Triglycerides 71 <150 mg/dL    Direct Measure HDL 72 >=50 mg/dL    LDL Cholesterol Calculated 99 <=100 mg/dL    Non HDL Cholesterol 113 <130 mg/dL    Patient Fasting > 8hrs? Yes     Narrative    Cholesterol  Desirable:  <200 mg/dL    Triglycerides  Normal:  Less than 150 mg/dL  Borderline High:  150-199 mg/dL  High:  200-499 mg/dL  Very High:  Greater than or equal to 500 mg/dL    Direct Measure HDL  Female:  Greater than or equal to 50 mg/dL   Male:  Greater than or equal to 40 mg/dL    LDL Cholesterol  Desirable:  <100mg/dL  Above Desirable:  100-129 mg/dL   Borderline High:  130-159 mg/dL   High:  160-189 mg/dL   Very High:  >= 190 mg/dL    Non HDL Cholesterol  Desirable:  130 mg/dL  Above Desirable:  130-159 mg/dL  Borderline High:  160-189 mg/dL  High:  190-219 mg/dL  Very High:  Greater than or equal to 220 mg/dL   Basic metabolic panel  (Ca, Cl, CO2, Creat, Gluc, K, Na, BUN)   Result Value Ref Range    Sodium 142 135 - 145 mmol/L      Comment:      Reference intervals for this test were updated on 09/26/2023 to more accurately reflect our healthy population. There may be differences in the flagging of prior results with similar values performed with this method. Interpretation of those prior results can be made in the context of the updated reference intervals.     Potassium 4.4 3.4 - 5.3 mmol/L    Chloride 104 98 - 107 mmol/L    Carbon Dioxide (CO2) 30 (H) 22 - 29 mmol/L    Anion Gap 8 7 - 15 mmol/L     Urea Nitrogen 18.6 8.0 - 23.0 mg/dL    Creatinine 0.74 0.51 - 0.95 mg/dL    GFR Estimate 81 >60 mL/min/1.73m2    Calcium 9.4 8.8 - 10.2 mg/dL    Glucose 107 (H) 70 - 99 mg/dL   TSH with free T4 reflex   Result Value Ref Range    TSH 2.63 0.30 - 4.20 uIU/mL       If you have any questions or concerns, please call the clinic at the number listed above.       Sincerely,      BERTA Chand CNP
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
## 2024-04-12 ENCOUNTER — THERAPY VISIT (OUTPATIENT)
Dept: PHYSICAL THERAPY | Facility: CLINIC | Age: 79
End: 2024-04-12
Attending: NURSE PRACTITIONER
Payer: MEDICARE

## 2024-04-12 DIAGNOSIS — M25.512 CHRONIC PAIN OF BOTH SHOULDERS: ICD-10-CM

## 2024-04-12 DIAGNOSIS — G89.29 CHRONIC PAIN OF BOTH SHOULDERS: ICD-10-CM

## 2024-04-12 DIAGNOSIS — M25.511 CHRONIC PAIN OF BOTH SHOULDERS: ICD-10-CM

## 2024-04-12 PROCEDURE — 97161 PT EVAL LOW COMPLEX 20 MIN: CPT | Mod: GP | Performed by: PHYSICAL MEDICINE & REHABILITATION

## 2024-04-12 PROCEDURE — 97110 THERAPEUTIC EXERCISES: CPT | Mod: GP | Performed by: PHYSICAL MEDICINE & REHABILITATION
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
## 2024-04-16 ENCOUNTER — OFFICE VISIT (OUTPATIENT)
Dept: VASCULAR SURGERY | Facility: CLINIC | Age: 79
End: 2024-04-16
Payer: COMMERCIAL

## 2024-04-16 DIAGNOSIS — I83.893 VARICOSE VEINS OF LEG WITH EDEMA, BILATERAL: Primary | ICD-10-CM

## 2024-04-16 DIAGNOSIS — I83.813 VARICOSE VEINS OF BILATERAL LOWER EXTREMITIES WITH PAIN: ICD-10-CM

## 2024-04-16 DIAGNOSIS — I83.893 SYMPTOMATIC VARICOSE VEINS OF BOTH LOWER EXTREMITIES: ICD-10-CM

## 2024-04-16 PROCEDURE — 99203 OFFICE O/P NEW LOW 30 MIN: CPT | Performed by: SPECIALIST

## 2024-04-16 NOTE — LETTER
4/16/2024         RE: Violet Robins  [address]  [address]  [place] [address]        Dear Colleague,    Thank you for referring your patient, Violet Robins, to the [facility] [place]. Please see a copy of my visit note below.    Consult requested by Komal Lynch    Reason for consultation: Bilateral varicose veins with pain    HPI:  Patient is an 79-year-old white female (poor historian) presenting with a complaint of bilateral leg swelling and tiredness and achiness and pain worse at the end of the day.  She states when she wakes up in the morning her legs feel okay.  This was going on about 5 to 6 years.  She feels the left leg worsened after getting bunion surgery many years ago.  She has been wearing compression for for several years only marginal relief.  She denies any leg trauma, DVT, superficial phlebitis or nonhealing wounds.  She has not had a recent ultrasound.  Her left leg is worse than her right.  She now presents for evaluation for bilateral leg swelling and pain.    Past Medical History:   Diagnosis Date     Breast cancer (H) 2009    right     Past Surgical History:   Procedure Laterality Date     BIOPSY BREAST       LUMPECTOMY BREAST Right 2009    with radiation     Current Outpatient Medications   Medication Sig Dispense Refill     albuterol (PROAIR HFA/PROVENTIL HFA/VENTOLIN HFA) 108 (90 Base) MCG/ACT inhaler Inhale 1-2 puffs into the lungs every 6 hours as needed for shortness of breath / dyspnea or wheezing (Patient not taking: Reported on 2/20/2023) 18 g 3     amLODIPine (NORVASC) 5 MG tablet Take 1 tablet (5 mg) by mouth daily 90 tablet 3     ascorbic acid 500 MG TABS Take 1-2 mg by mouth every 24 hours       aspirin (ASA) 81 MG tablet Take 1 tablet by mouth every 24 hours       Calcium Carbonate-Vitamin D (CALCIUM + D PO) Take  by mouth.       Cholecalciferol (VITAMIN D) 1000 UNITS capsule Take 1 capsule by mouth daily       Fexofenadine HCl  (MUCINEX ALLERGY PO) Take by mouth 2 times daily       Glucosamine-Chondroit-Vit C-Mn (GLUCOSAMINE 1500 COMPLEX) CAPS Take 1 capsule by mouth daily        losartan (COZAAR) 25 MG tablet Take 1 tablet (25 mg) by mouth daily 90 tablet 3     metroNIDAZOLE (METROGEL) 0.75 % external gel APPLY TO AFFECTED AREA(S) TWICE DAILY 45 g 5     montelukast (SINGULAIR) 10 MG tablet TAKE ONE TABLET BY MOUTH AT BEDTIME 90 tablet 3     Multiple Vitamin (MULTIVITAMIN OR) Take 1 tablet by mouth daily        VITAMIN E Take 1 capsule by mouth daily       No current facility-administered medications for this visit.        Allergies   Allergen Reactions     Bee Venom Hives and Swelling     PN: swelling and then went to cellulitis     Oxycontin [Oxycodone Hcl] Nausea and Vomiting     Codeine Nausea and Vomiting     Latex Other (See Comments)     PN: Converted from LW Latex Sensitivity Flag     Lisinopril Cough     Tramadol Nausea and Vomiting     Social History     Socioeconomic History     Marital status:      Spouse name: Not on file     Number of children: Not on file     Years of education: Not on file     Highest education level: Not on file   Occupational History     Not on file   Tobacco Use     Smoking status: Never     Smokeless tobacco: Never   Vaping Use     Vaping status: Never Used   Substance and Sexual Activity     Alcohol use: Not Currently     Drug use: Never     Sexual activity: Yes     Partners: Male     Birth control/protection: Post-menopausal, Female Surgical   Other Topics Concern     Not on file   Social History Narrative     Not on file     Social Determinants of Health     Financial Resource Strain: High Risk (2/9/2024)    Financial Resource Strain      Within the past 12 months, have you or your family members you live with been unable to get utilities (heat, electricity) when it was really needed?: Yes   Food Insecurity: Low Risk  (2/9/2024)    Food Insecurity      Within the past 12 months, did you worry  that your food would run out before you got money to buy more?: No      Within the past 12 months, did the food you bought just not last and you didn t have money to get more?: No   Transportation Needs: Low Risk  (2/9/2024)    Transportation Needs      Within the past 12 months, has lack of transportation kept you from medical appointments, getting your medicines, non-medical meetings or appointments, work, or from getting things that you need?: No   Physical Activity: Unknown (2/9/2024)    Exercise Vital Sign      Days of Exercise per Week: 2 days      Minutes of Exercise per Session: Not on file   Stress: No Stress Concern Present (2/9/2024)    [employer] - Occupational Stress Questionnaire      Feeling of Stress : Not at all   Social Connections: Unknown (2/9/2024)    Social Connection and Isolation Panel [NHANES]      Frequency of Communication with Friends and Family: Not on file      Frequency of Social Gatherings with Friends and Family: More than three times a week      Attends Mu-ism Services: Not on file      Active Member of Clubs or Organizations: Not on file      Attends Club or Organization Meetings: Not on file      Marital Status: Not on file   Interpersonal Safety: Low Risk  (2/9/2024)    Interpersonal Safety      Do you feel physically and emotionally safe where you currently live?: Yes      Within the past 12 months, have you been hit, slapped, kicked or otherwise physically hurt by someone?: No      Within the past 12 months, have you been humiliated or emotionally abused in other ways by your partner or ex-partner?: No   Housing Stability: Low Risk  (2/9/2024)    Housing Stability      Do you have housing? : Yes      Are you worried about losing your housing?: No     Family History   Problem Relation Age of Onset     Coronary Artery Disease Father       ROS: 10 point ROS neg other than the symptoms noted above in the HPI.    PE:  B/P: Data Unavailable, T: Data  Unavailable, P: Data Unavailable, R: Data Unavailable  General: well developed, well nourished WF who appears their stated age  HEENT: NC/AT, EOMI, (-)icterus, (-)injection  Neck: Supple, No JVD  Chest: CTA  Heart: S1, S2, (-)m/r/g  Abd: Soft, non tender, non distended, non tender, no masses  Rectal: No masses  Ext; Warm.  Left lower extremity: +1 calf and ankle edema, extensive spiders, medial calf varicosities.  Right lower extremity: +1 ankle edema with scattered minimal varicosities and spider veins.    Psych: AAOx3  Neuro: No focal deficits      Impression/plan:  This is an 79-year-old lady with bilateral symptomatic varicose veins with the left being worse than the right.  She is a CEAP 3 bilaterally.  She does appear to be symptomatic despite compression therapy making her meet criteria for treatment.  I discussed the role of ultrasound and the planning of treatment.  She expressed understanding.  After discussion the patient the plan signs for an ultrasound of both lower extremities and proceed based on those findings.  Any further treatment be determined by the ultrasound findings.  She will follow-up with me after the ultrasound.    Rodrick Mancini MD, FACS      Again, thank you for allowing me to participate in the care of your patient.        Sincerely,        Rodrick Mancini MD

## 2024-04-16 NOTE — NURSING NOTE
Patient Reported symptoms:    Right leg   Heaviness Some of the time   Achiness A little of the time   Swelling Some of the time   Throbbing None of the time   Itching A little of the time   Appearance Moderately noticeable   Impact on work/activities Mildly reduced     Left Leg   Heaviness Most of the time   Achiness Most of the time   Swelling Most of the time   Throbbing Some of the time   Itching Some of the time   Appearance Moderately noticeable   Impact on work/activities Moderately reduced

## 2024-04-16 NOTE — PROGRESS NOTES
Consult requested by Komal Lynch    Reason for consultation: Bilateral varicose veins with pain    HPI:  Patient is an 79-year-old white female (poor historian) presenting with a complaint of bilateral leg swelling and tiredness and achiness and pain worse at the end of the day.  She states when she wakes up in the morning her legs feel okay.  This was going on about 5 to 6 years.  She feels the left leg worsened after getting bunion surgery many years ago.  She has been wearing compression for for several years only marginal relief.  She denies any leg trauma, DVT, superficial phlebitis or nonhealing wounds.  She has not had a recent ultrasound.  Her left leg is worse than her right.  She now presents for evaluation for bilateral leg swelling and pain.    Past Medical History:   Diagnosis Date    Breast cancer (H) 2009    right     Past Surgical History:   Procedure Laterality Date    BIOPSY BREAST      LUMPECTOMY BREAST Right 2009    with radiation     Current Outpatient Medications   Medication Sig Dispense Refill    albuterol (PROAIR HFA/PROVENTIL HFA/VENTOLIN HFA) 108 (90 Base) MCG/ACT inhaler Inhale 1-2 puffs into the lungs every 6 hours as needed for shortness of breath / dyspnea or wheezing (Patient not taking: Reported on 2/20/2023) 18 g 3    amLODIPine (NORVASC) 5 MG tablet Take 1 tablet (5 mg) by mouth daily 90 tablet 3    ascorbic acid 500 MG TABS Take 1-2 mg by mouth every 24 hours      aspirin (ASA) 81 MG tablet Take 1 tablet by mouth every 24 hours      Calcium Carbonate-Vitamin D (CALCIUM + D PO) Take  by mouth.      Cholecalciferol (VITAMIN D) 1000 UNITS capsule Take 1 capsule by mouth daily      Fexofenadine HCl (MUCINEX ALLERGY PO) Take by mouth 2 times daily      Glucosamine-Chondroit-Vit C-Mn (GLUCOSAMINE 1500 COMPLEX) CAPS Take 1 capsule by mouth daily       losartan (COZAAR) 25 MG tablet Take 1 tablet (25 mg) by mouth daily 90 tablet 3    metroNIDAZOLE (METROGEL) 0.75 % external gel APPLY TO  AFFECTED AREA(S) TWICE DAILY 45 g 5    montelukast (SINGULAIR) 10 MG tablet TAKE ONE TABLET BY MOUTH AT BEDTIME 90 tablet 3    Multiple Vitamin (MULTIVITAMIN OR) Take 1 tablet by mouth daily       VITAMIN E Take 1 capsule by mouth daily       No current facility-administered medications for this visit.        Allergies   Allergen Reactions    Bee Venom Hives and Swelling     PN: swelling and then went to cellulitis    Oxycontin [Oxycodone Hcl] Nausea and Vomiting    Codeine Nausea and Vomiting    Latex Other (See Comments)     PN: Converted from LW Latex Sensitivity Flag    Lisinopril Cough    Tramadol Nausea and Vomiting     Social History     Socioeconomic History    Marital status:      Spouse name: Not on file    Number of children: Not on file    Years of education: Not on file    Highest education level: Not on file   Occupational History    Not on file   Tobacco Use    Smoking status: Never    Smokeless tobacco: Never   Vaping Use    Vaping status: Never Used   Substance and Sexual Activity    Alcohol use: Not Currently    Drug use: Never    Sexual activity: Yes     Partners: Male     Birth control/protection: Post-menopausal, Female Surgical   Other Topics Concern    Not on file   Social History Narrative    Not on file     Social Determinants of Health     Financial Resource Strain: High Risk (2/9/2024)    Financial Resource Strain     Within the past 12 months, have you or your family members you live with been unable to get utilities (heat, electricity) when it was really needed?: Yes   Food Insecurity: Low Risk  (2/9/2024)    Food Insecurity     Within the past 12 months, did you worry that your food would run out before you got money to buy more?: No     Within the past 12 months, did the food you bought just not last and you didn t have money to get more?: No   Transportation Needs: Low Risk  (2/9/2024)    Transportation Needs     Within the past 12 months, has lack of transportation kept you  from medical appointments, getting your medicines, non-medical meetings or appointments, work, or from getting things that you need?: No   Physical Activity: Unknown (2/9/2024)    Exercise Vital Sign     Days of Exercise per Week: 2 days     Minutes of Exercise per Session: Not on file   Stress: No Stress Concern Present (2/9/2024)    [employer] - Occupational Stress Questionnaire     Feeling of Stress : Not at all   Social Connections: Unknown (2/9/2024)    Social Connection and Isolation Panel [NHANES]     Frequency of Communication with Friends and Family: Not on file     Frequency of Social Gatherings with Friends and Family: More than three times a week     Attends Congregational Services: Not on file     Active Member of Clubs or Organizations: Not on file     Attends Club or Organization Meetings: Not on file     Marital Status: Not on file   Interpersonal Safety: Low Risk  (2/9/2024)    Interpersonal Safety     Do you feel physically and emotionally safe where you currently live?: Yes     Within the past 12 months, have you been hit, slapped, kicked or otherwise physically hurt by someone?: No     Within the past 12 months, have you been humiliated or emotionally abused in other ways by your partner or ex-partner?: No   Housing Stability: Low Risk  (2/9/2024)    Housing Stability     Do you have housing? : Yes     Are you worried about losing your housing?: No     Family History   Problem Relation Age of Onset    Coronary Artery Disease Father       ROS: 10 point ROS neg other than the symptoms noted above in the HPI.    PE:  B/P: Data Unavailable, T: Data Unavailable, P: Data Unavailable, R: Data Unavailable  General: well developed, well nourished WF who appears their stated age  HEENT: NC/AT, EOMI, (-)icterus, (-)injection  Neck: Supple, No JVD  Chest: CTA  Heart: S1, S2, (-)m/r/g  Abd: Soft, non tender, non distended, non tender, no masses  Rectal: No masses  Ext; Warm.  Left  lower extremity: +1 calf and ankle edema, extensive spiders, medial calf varicosities.  Right lower extremity: +1 ankle edema with scattered minimal varicosities and spider veins.    Psych: AAOx3  Neuro: No focal deficits      Impression/plan:  This is an 79-year-old lady with bilateral symptomatic varicose veins with the left being worse than the right.  She is a CEAP 3 bilaterally.  She does appear to be symptomatic despite compression therapy making her meet criteria for treatment.  I discussed the role of ultrasound and the planning of treatment.  She expressed understanding.  After discussion the patient the plan signs for an ultrasound of both lower extremities and proceed based on those findings.  Any further treatment be determined by the ultrasound findings.  She will follow-up with me after the ultrasound.    Rodrick Mancini MD, FACS

## 2024-04-17 ENCOUNTER — OFFICE VISIT (OUTPATIENT)
Dept: VASCULAR SURGERY | Facility: CLINIC | Age: 79
End: 2024-04-17
Attending: SPECIALIST
Payer: COMMERCIAL

## 2024-04-17 ENCOUNTER — ANCILLARY PROCEDURE (OUTPATIENT)
Dept: ULTRASOUND IMAGING | Facility: CLINIC | Age: 79
End: 2024-04-17
Attending: SPECIALIST
Payer: COMMERCIAL

## 2024-04-17 ENCOUNTER — MEDICAL CORRESPONDENCE (OUTPATIENT)
Dept: HEALTH INFORMATION MANAGEMENT | Facility: CLINIC | Age: 79
End: 2024-04-17

## 2024-04-17 DIAGNOSIS — I83.893 VARICOSE VEINS OF BOTH LEGS WITH EDEMA: ICD-10-CM

## 2024-04-17 DIAGNOSIS — I83.12 VARICOSE VEINS OF LEFT LOWER EXTREMITY WITH INFLAMMATION: ICD-10-CM

## 2024-04-17 DIAGNOSIS — I83.893 VARICOSE VEINS OF LEG WITH EDEMA, BILATERAL: ICD-10-CM

## 2024-04-17 DIAGNOSIS — I83.813 VARICOSE VEINS OF BILATERAL LOWER EXTREMITIES WITH PAIN: Primary | ICD-10-CM

## 2024-04-17 PROCEDURE — 93970 EXTREMITY STUDY: CPT | Performed by: SPECIALIST

## 2024-04-17 PROCEDURE — 99213 OFFICE O/P EST LOW 20 MIN: CPT | Performed by: SPECIALIST

## 2024-04-17 NOTE — PATIENT INSTRUCTIONS
Milly Burk, Surgery Scheduler - [phone]. Milly will  call you when insurance responds. Process can take 3-4 weeks total.    Procedure Plan:   1. Left leg VNUS closure of great saphenous vein (GSV) and phlebectomies  2. Right leg VNUS closure of GSV    Thigh High Compression Stockings are recommended, medical grade, 20-30 mmHg strength.    Options for purchasing Compression stockings:    You can call your insurance company and ask if compression stockings are covered.  They usually fall under your Durable Medical Equipment (DME) coverage, if covered. The DME codes if they ask are: Knee high , Thigh high , Panty . Be sure to ask if you need a specific diagnosis for coverage, and if your deductible needs to be met first.  If insurance covers and you would like to order from [facility], or another medical supply company: call the clinic back and we can fax a prescription to your medical supply company of choice. They will bill your insurance and ship them to you. We will ask you color choice (black or beige), and toe choice (open or closed toe).    We sell many sizes in our clinic (except specialty order or petite sizing). We can check to see if we have your size.  Knee high (Mediven brand) are $60/pair  Thigh high (Mediven brand) are $85/pair.   If you would like to purchase from the clinic, let us know, and we will set them aside for you to  and pay for at your next clinic appointment or the day of your procedure.    Online website ordering options:   [email or web address] has many options available.    Measurements:   Right Ankle: 22.5 cm   Right Calf: 37.5 cm   Right Thigh: 56 cm     Left Ankle: 24 cm   Left Calf: 37.5 cm   Left Thigh: 57 cm     Leg Length: 68 cm   Calf Length: 42 cm         Pre-Procedure Instructions:                           VNUS Closure and Phlebectomies   You are having a Closure(s) - where one or more veins are closed and  Phlebectomies - where one or more veins are removed.   Insurance  Precertification and/or referral authorization may be required by your insurance company.  We will call your insurance company to verify benefits for the medically necessary part of your procedure.    Your Current Medications and Allergies  To reduce bruising, please do not take aspirin-type medications (Motrin, Aleve, Ibuprofen, Advil, etc.) for three days before your procedure. You may take Tylenol if you need a pain reliever.  Are you on blood thinner medications? (Plavix, Coumadin, Eliquis, Xarelto) Please discuss this with your surgeon. You may resume taking your blood thinner medication after your procedure.  Are you sensitive to latex or adhesives used for fake fingernails? Please let us know!    Driving Escort and   Please arrange to have a trusted adult (18 years old or older) drive you to and from the clinic.  For your safety, we recommend you have a trusted adult to stay with you until the next morning.    Your Health  If you have a change in your health before the procedure, contact our office immediately. (For example: cold symptoms, cough, urinary tract infection, fever, flu symptoms.)  A pre-procedure physical is not required.    Note  It is sometimes necessary to adjust the procedure schedule due to emergencies. We greatly appreciate your flexibility and understanding in this matter.                  Check List: The Morning of Your Procedure  ___1. Please do not put anything on your leg(s) or shave the day of your procedure.  ___2. You may take your normal medications the day of your procedure.  ___3. It is recommended you eat a light breakfast or lunch the day of your procedure.  ___4. Wear comfortable loose-fitting clothing and wide-fitting shoes (i.e. tennis shoes, slip-ons).  ___5. Please arrive at our clinic at the specified time given by the nurse.  ___6. You will sign an affirmation of informed consent.  ___7. Bring  your pre-procedure sedation medication (lorazepam and clonidine) with you to the clinic.              One hour before your procedure, you will be instructed to take these medications. The lorazepam              (Ativan) lowers anxiety and sedates you; the clonidine makes the lorazepam more effective. Everyone's              body processes these medications differently. Therefore, reactions to these medications vary. Some              people stay awake and some people sleep through the whole procedure. You may not remember              everything about the procedure or the day. You do not want to make any big decisions for the rest of the              day.    The Day of Your Procedure:       VNUS Closure and Phlebectomies  In the Exam Room  A nurse will bring you back to an exam room with your family member or friend. This is when your informed consent will be signed, and you will take your pre-procedure medications.  You will be asked to remove everything from the waist down, including undergarments. You will then put on a hospital gown or shorts and blue booties.  Your surgeon will come in to answer any questions and julian any bulging varicose veins to be removed.  You will be taken to the restroom to empty your bladder before going into the procedure room.    In the Procedure Room  You will be escorted to the procedure room. You will lie on a procedure table covered with a sheet or blanket.  A nurse will put a blood pressure cuff on your arm and a pulse/oxygen monitor on a finger. Your vital signs will be monitored every 15 minutes.  Your gown will be pulled up slightly and the groin exposed for a short period of time. The surgeon's assistant will clean your foot, leg, and groin with an antibacterial solution. We will get you covered up as quickly as possible!  Sterile towels and blue drapes will be used to cover you and the table. You will be asked to keep your hands under the blue drapes during the  procedure.  The lights will be turned down. The table will be tipped so your head is higher than your feet. You may feel like you're going to slide off, but you won't.    The Procedure  The surgeon will visualize your veins with an ultrasound machine. He or she will then numb your skin and access the vein. A catheter is passed up the vein and positioned with ultrasound guidance. The table will then be tipped head down.  Once the catheter is in the correct position, medication will be injected to numb your leg. You will feel some needle sticks and may feel discomfort as the medication goes in. Once this is done, you should not experience significant discomfort. But if you do, please let us know and more numbing medication can be injected. As the catheter sends out heat, the vein closes off and the catheter is withdrawn.  For the phlebectomy part of the procedure, small incisions are made where the bulging varicose veins have been marked on your leg(s) and these veins will be removed using a small sienna hook instrument.    Post-Procedure  Once the procedure is done, your leg(s) will be washed with warm water and dried. Your leg(s) will be bandaged with large soft dressings and a large ACE bandage wrapped from toes to groin.   You will be offered something to drink and a light snack.  You will rest with your leg(s) elevated for approximately 30 minutes. Your friend or family member may join you.  For your safety, you will be taken to your car in a wheelchair. If you are able to, it is good to keep your leg(s) elevated on the car ride home.    Post-Procedure Instructions:             VNUS Closure and Phlebectomies      Post-Op Day Zero - The Day of Your Procedure:0  1. Medication for Pain Control and Inflammation Control   - The numbing medication injected during your procedure will last for several hours. The pre-procedure                 tablets may make you very sleepy and you might not remember everything from the  procedure or from                 the day. This will usually wear off by the next day.   - Ibuprofen:  If tolerated, take ibuprofen (e.g., Advil) to reduce inflammation whether or not you have                 pain. For three days, take two tablets (200 mg each) with every meal and at bedtime with a snack. If                 your pain is not controlled with ibuprofen, you may take prescription pain medication (such as Norco),                 if prescribed.   - You may resume taking any medications you were taking before your procedure.  2. Activity   - Rest with your leg(s) elevated above your heart. This will prevent from a lot of swelling and                 bleeding. You do not need to elevate your leg(s) while sleeping at night. You may go upstairs, sit up to                 eat, use the bathroom, and take several five-minute walks. Otherwise, keep your leg(s) elevated.                 Minimize the amount of time you are up on your feet to about 30 minutes at a time.  3. Bandages   - The incision sites will be covered with soft bandages and an ACE wrap. Keep your bandages on                 and dry for 48 hours. The ACE should provide  snug  compression but should not cause pain or                 numbness in the toes. If you have significant discomfort or your toes become cold or numb, unwrap                 your ACE and rewrap with less tension starting at the toes wrapping upward.  4. Incisions   - Bleeding: You may see some incision sites that are oozing through the bandages. This is not unusual      and can be managed with Rest, Ice, Compression and Elevation (RICE). Apply ice and firm pressure      directly to the site that is bleeding and rest with your leg(s) elevated above your heart for 20-30                 minutes.    Post-Op Day One:  1. Medication   - Ibuprofen: Continue the same as the Day of Your Procedure. If your pain is not controlled with                 ibuprofen, you may take prescription  pain medication (such as Norco), if prescribed.   2. Activity   - We would like you to get up at least six times and walk around for short periods of time, unless it is      causing you pain. You should not be on your feet more than 90 minutes at a time. Elevate your leg      above your heart when you are not walking.  3. Bandages   - Your bandages must be kept on and dry for 48 hours.  4. Driving   - You may resume driving when you can do so safely. Do not drive if you are taking narcotic pain      medication.  Post-Op Day Two:   1. Medication  - Ibuprofen: Continue the same as the Day of Your Procedure.  2.  Activity  - Walk as tolerated. Elevate as much as possible when not walking.  3. Bandages and Compression  - Remove ACE wrap and padding. Shower and put on your compression hose during waking hours only       for at least 5 days. (Your doctor may instruct you to keep your bandages on until your return     appointment; please follow your doctor's instructions.)  4. Incisions  - Your leg(s) will be bruised; there may be swelling, hard knots under the skin and possibly some     numbness. These will likely resolve over time. If you see  hair-like  strings coming out of your     incisions, do not pull them (this will only cause pain/discomfort). We will trim them when you come     back for your follow-up appointment.  5. Call Us If:   - You see any areas on your leg that are red and angry in appearance.   - You notice any drainage that is milky or cloudy in appearance or that has a foul odor.   - You run a temperature of 100.5 or greater.    Post-Op Day Three:  You will have a follow up appointment 2-4 days post-procedure. At this appointment, you will have an ultrasound and we will check your incisions.    ________________________________________________________________________________________    The Two Weeks Following Your Procedure  1.  Skin Care   - Do not use any lotions, creams or powders on your incisions  for 14 days or until the incisions have                 healed.   - Do not soak in a bathtub, hot tub or go swimming for 14 days or until your incisions have healed.  2.  Medications   - You may use ibuprofen or acetaminophen (e.g., Tylenol) as needed for pain or discomfort.  3.  Activity   - Do not lift over 25 pounds. After about two weeks you may resume exercise such as aerobics,                 running, tennis or weightlifting. Use your common sense and ease back into your exercise routine                 slowly.   - You may feel a cord-like tightness along the inside of your leg. Gentle stretching can be helpful.  4. Compression Hose   - Your doctor may instruct you to wear compression for longer than seven days; please follow your                 doctor's instructions. As a comfort measure, you may choose to wear compression for longer than                 required.  5.  Travel   - Do not fly in an airplane for 14 days after your procedure. If you have a long car trip planned within                 two to three weeks following your procedure, stop and walk for a few minutes every two hours.      Periodic ankle pumps during the ride may be helpful.    Six Week Appointment  - At your six-week appointment, you will see your surgeon for an exam and evaluation. This office visit     will be scheduled when you return for post-op day three return appointment.       Return to Work  1.  If you work outside the home, you may return to work in a few days depending on the extent of your        procedure, how you tolerate it, and the type of work you perform.  2.  Paperwork: If your employer requires paperwork or you would like a letter written to your employer, please        let us know. We will complete disability type forms at no charge. Please allow five business days for forms        to be completed.

## 2024-04-17 NOTE — LETTER
4/17/2024         RE: Violet Robins  [address]  [address]  [place] [address]        Dear Colleague,    Thank you for referring your patient, Violet Robins, to the [facility] [place]. Please see a copy of my visit note below.    Follow-up for ultrasound        Subjective:  Patient is an 79-year-old white female (poor historian) presenting with a complaint of bilateral leg swelling and tiredness and achiness and pain worse at the end of the day.  She states when she wakes up in the morning her legs feel okay.  This was going on about 5 to 6 years.  She feels the left leg worsened after getting bunion surgery many years ago.  She has been wearing compression for for several years only marginal relief.  Left leg is worse than right.  She denies any leg trauma, DVT, superficial phlebitis or nonhealing wounds.     She had her ultrasound for which she now follows up.    Objective:  B/P: Data Unavailable, T: Data Unavailable, P: Data Unavailable, R: Data Unavailable  Ext; Warm.  Left lower extremity: +1 calf and ankle edema, extensive spiders, medial calf varicosities.  Right lower extremity: +1 ankle edema with scattered minimal varicosities and spider veins.        Ultrasound preliminary: Right GSV reflux from SFJ to knee giving rise to multiple incompetent varicose branches.  Left GSV reflux SFJ to distal GSV giving rise to multiple incompetent branches.  There are 2 perforators on left but they are too small to treat    Assessment plan:  This is an 79-year-old lady with bilateral symptomatic varicose veins with the left being worse than the right.  She is a CEAP 3 bilaterally.  She does appear to be symptomatic despite compression therapy making her meet criteria for treatment.  Based on her ultrasound she is a candidate for a left GSV RF ablation with stab phlebectomy.  The perforators on that side are too small to treat.  On the right she is a candidate for GSV  ablation in the thigh.   The procedure, risks, benefits, and alternatives were discussed and the patient agrees to proceed.  She will be scheduled once insurance approval is obtained.      Rodrick Mancini MD, FACS                  April 17, 2024    Vein Procedure Recommendation    Spoke with patient in clinic.    Dr. Mancini has recommended patient to have the following vein procedure(s):     1. Left leg VNUS closure GSV and 10-20 Phlebectomies (medically necessary)    2. Right leg VNUS closure GSV    Patient Pre-op Questions:  Preferred Pharmacy: ask  Anticoagulant/ASA: Yes- ASA 81 mg  Artificial Joint or Heart Valve:  No  Open ulcer:  No  Sedation nausea: Yes      Patient is recommended to wear Thigh High compression hose following her procedure. Discussed compression hose. Explained to patient if insurance doesn't cover the compression hose there are a couple different options to getting them and to call the clinic to let us know if they need help. Entered information on options in AVS.   Patient may purchase in clinic, size 3.    Handed patient written procedure instructions to review on her own (see After Visit Summary).    Next steps:    Insurance Submission  Informed patient this process could take up to 14 business days, but once approved, the patient will be contacted by our surgery scheduler to schedule the above procedure. Gave patient our surgery scheduler's information.    Patient is in agreement with all of the above and has no further questions at this time.    Andi Nayak RN  [facility]  Vein Clinic      Again, thank you for allowing me to participate in the care of your patient.        Sincerely,        Rodrick Mancini MD   Previously Declined (within the last year)

## 2024-04-17 NOTE — PROGRESS NOTES
April 17, 2024    Vein Procedure Recommendation    Spoke with patient in clinic.    Dr. Mancini has recommended patient to have the following vein procedure(s):     1. Left leg VNUS closure GSV and 10-20 Phlebectomies (medically necessary)    2. Right leg VNUS closure GSV    Patient Pre-op Questions:  Preferred Pharmacy: ask  Anticoagulant/ASA: Yes- ASA 81 mg  Artificial Joint or Heart Valve:  No  Open ulcer:  No  Sedation nausea: Yes      Patient is recommended to wear Thigh High compression hose following her procedure. Discussed compression hose. Explained to patient if insurance doesn't cover the compression hose there are a couple different options to getting them and to call the clinic to let us know if they need help. Entered information on options in AVS.   Patient may purchase in clinic, size 3.    Handed patient written procedure instructions to review on her own (see After Visit Summary).    Next steps:    Insurance Submission  Informed patient this process could take up to 14 business days, but once approved, the patient will be contacted by our surgery scheduler to schedule the above procedure. Gave patient our surgery scheduler's information.    Patient is in agreement with all of the above and has no further questions at this time.    Andi Nayak RN  [facility]  Vein Clinic

## 2024-04-17 NOTE — PROGRESS NOTES
Follow-up for ultrasound        Subjective:  Patient is an 79-year-old white female (poor historian) presenting with a complaint of bilateral leg swelling and tiredness and achiness and pain worse at the end of the day.  She states when she wakes up in the morning her legs feel okay.  This was going on about 5 to 6 years.  She feels the left leg worsened after getting bunion surgery many years ago.  She has been wearing compression for for several years only marginal relief.  Left leg is worse than right.  She denies any leg trauma, DVT, superficial phlebitis or nonhealing wounds.     She had her ultrasound for which she now follows up.    Objective:  B/P: Data Unavailable, T: Data Unavailable, P: Data Unavailable, R: Data Unavailable  Ext; Warm.  Left lower extremity: +1 calf and ankle edema, extensive spiders, medial calf varicosities.  Right lower extremity: +1 ankle edema with scattered minimal varicosities and spider veins.        Ultrasound preliminary: Right GSV reflux from SFJ to knee giving rise to multiple incompetent varicose branches.  Left GSV reflux SFJ to distal GSV giving rise to multiple incompetent branches.  There are 2 perforators on left but they are too small to treat    Assessment plan:  This is an 79-year-old lady with bilateral symptomatic varicose veins with the left being worse than the right.  She is a CEAP 3 bilaterally.  She does appear to be symptomatic despite compression therapy making her meet criteria for treatment.  Based on her ultrasound she is a candidate for a left GSV RF ablation with stab phlebectomy.  The perforators on that side are too small to treat.  On the right she is a candidate for GSV ablation in the thigh.   The procedure, risks, benefits, and alternatives were discussed and the patient agrees to proceed.  She will be scheduled once insurance approval is obtained.      Rodrick Mancini MD, FACS

## 2024-04-19 ENCOUNTER — THERAPY VISIT (OUTPATIENT)
Dept: PHYSICAL THERAPY | Facility: CLINIC | Age: 79
End: 2024-04-19
Attending: NURSE PRACTITIONER
Payer: MEDICARE

## 2024-04-19 DIAGNOSIS — M25.512 CHRONIC PAIN OF BOTH SHOULDERS: Primary | ICD-10-CM

## 2024-04-19 DIAGNOSIS — M25.511 CHRONIC PAIN OF BOTH SHOULDERS: Primary | ICD-10-CM

## 2024-04-19 DIAGNOSIS — G89.29 CHRONIC PAIN OF BOTH SHOULDERS: Primary | ICD-10-CM

## 2024-04-19 PROCEDURE — 97110 THERAPEUTIC EXERCISES: CPT | Mod: GP | Performed by: PHYSICAL MEDICINE & REHABILITATION

## 2024-04-26 ENCOUNTER — THERAPY VISIT (OUTPATIENT)
Dept: PHYSICAL THERAPY | Facility: CLINIC | Age: 79
End: 2024-04-26
Attending: NURSE PRACTITIONER
Payer: MEDICARE

## 2024-04-26 DIAGNOSIS — M25.512 CHRONIC PAIN OF BOTH SHOULDERS: Primary | ICD-10-CM

## 2024-04-26 DIAGNOSIS — M25.511 CHRONIC PAIN OF BOTH SHOULDERS: Primary | ICD-10-CM

## 2024-04-26 DIAGNOSIS — G89.29 CHRONIC PAIN OF BOTH SHOULDERS: Primary | ICD-10-CM

## 2024-04-26 PROCEDURE — 97110 THERAPEUTIC EXERCISES: CPT | Mod: GP | Performed by: PHYSICAL MEDICINE & REHABILITATION

## 2024-05-01 ENCOUNTER — THERAPY VISIT (OUTPATIENT)
Dept: PHYSICAL THERAPY | Facility: CLINIC | Age: 79
End: 2024-05-01
Attending: NURSE PRACTITIONER
Payer: MEDICARE

## 2024-05-01 DIAGNOSIS — M25.512 CHRONIC PAIN OF BOTH SHOULDERS: Primary | ICD-10-CM

## 2024-05-01 DIAGNOSIS — M25.511 CHRONIC PAIN OF BOTH SHOULDERS: Primary | ICD-10-CM

## 2024-05-01 DIAGNOSIS — G89.29 CHRONIC PAIN OF BOTH SHOULDERS: Primary | ICD-10-CM

## 2024-05-01 PROCEDURE — 97110 THERAPEUTIC EXERCISES: CPT | Mod: GP | Performed by: PHYSICAL MEDICINE & REHABILITATION

## 2024-05-01 NOTE — PROGRESS NOTES
Discharge Note  05/01/24 0500   Appointment Info   Signing clinician's name / credentials Crystal Veronica, PT, DPT   Total/Authorized Visits 8   Visits Used 4   Medical Diagnosis Chronic pain of both shoulders   PT Tx Diagnosis chronic B shoulder pain   Other pertinent information [facility]   Peggy Adds Certification   Progress Note/Certification   Start of Care Date 04/12/24   Onset of illness/injury or Date of Surgery 02/09/24  (date of order, chronic condition)   Therapy Frequency 1x/week   Predicted Duration 8 weeks   Certification date from 04/12/24   Certification date to 06/07/24   Progress Note Due Date 06/07/24   GOALS   PT Goals 2;3   PT Goal 1   Goal Identifier 1   Goal Description Pt will be able to flex B shoulders 150* in order to decrease difficulty with reaching overhead   Target Date 05/10/24   Date Met 04/19/24   PT Goal 2   Goal Identifier 2   Goal Description Pt will be able to demonstrate lifting 1-2# weight in cupboard overhead for 30 seconds in order to decrease difficulty with putting away dishes   Target Date 05/24/24   Date Met 05/01/24   PT Goal 3   Goal Identifier 3   Goal Description Pt will be independent with HEP in order to self manage symptoms   Target Date 06/07/24   Date Met 05/01/24   Subjective Report   Subjective Report Pt reports shoulders have been feeling good, and notes she can sleep now without pain. Reports no longer getting catching of R shoulder when driving. Can do dishes with less difficulty/pain   Objective Measures   Objective Measures Objective Measure 1;Objective Measure 2;Objective Measure 3;Objective Measure 4;Objective Measure 5   Objective Measure 1   Objective Measure L shoulder AROM   Details flexion: 170*, abd: 180*, ER: T3/4, IR T8/9   Objective Measure 2   Objective Measure B shoulder strength   Details flexion: 4+/5, abd: 4+/5, ER: 4-/5, IR: 5/5, ext: 5/5   Objective Measure 3   Objective Measure SPADI   Details 10% disability (30% disability at initial  eval)   Objective Measure 4   Objective Measure R shoulder AROM   Details flexion: 170*, abd: 180*, ER: T3/4, IR T7/8   Objective Measure 5   Objective Measure lifting   Details able to lift 1-2# weights into cupboard for 30 seconds without pain   Treatment Interventions (PT)   Interventions Therapeutic Procedure/Exercise   Therapeutic Procedure/Exercise   Therapeutic Procedures: strength, endurance, ROM, flexibility minutes (22425) 25   Therapeutic Procedures Ther Proc 2;Ther Proc 3;Ther Proc 4;Ther Proc 5;Ther Proc 6;Ther Proc 7   Ther Proc 1 shoulder 3way with 0#   Ther Proc 1 - Details 10x1 each B   Ther Proc 2 reaching 1-2# weights in cupboard   Ther Proc 2 - Details 30 seconds x1   Ther Proc 3 HEP review/instruct   Ther Proc 3 - Details reviewed current TB exercises and supine AROM and instructed to cont   Ther Proc 4 scap sets   Ther Proc 4 - Details 10x1   Ther Proc 5 upper trap stretch   Ther Proc 5 - Details 30 seconds x2 B   Skilled Intervention HEP review/instruct for D/C   Patient Response/Progress pt demonstrated and verbalized good understanding   Eval/Assessments   Assessments   (Pt has attended 4 PT sessions and has met 3/3 short term and long term goals. Pt's mobility, strength, pain and sleep have improved and pt has returned to PLOF. Pt is appropriate for D/C as she has met all goals and is independent with HEP)   Education   Learner/Method Patient;Listening;Reading;Demonstration;Pictures/Video   Education Comments pt demonstrated and verbalized good understanding   Plan   Home program [email or web address]   Updates to plan of care D/C from PT   Total Session Time   Timed Code Treatment Minutes 25   Total Treatment Time (sum of timed and untimed services) 25       DISCHARGE  Reason for Discharge: Patient has met all goals.    Equipment Issued: therabands    Discharge Plan: Patient to continue home program.    Referring Provider:  Komal Lynch    Please contact me with any questions or  concerns.    Thank you for your referral,     Crystal Veronica, PT, DPT  Physical Therapist  [address] [address] [phone]

## 2024-05-07 DIAGNOSIS — I83.12 VARICOSE VEINS OF LEFT LOWER EXTREMITY WITH INFLAMMATION: Primary | ICD-10-CM

## 2024-05-11 NOTE — TELEPHONE ENCOUNTER
"Requested Prescriptions   Pending Prescriptions Disp Refills     montelukast (SINGULAIR) 10 MG tablet [Pharmacy Med Name: Montelukast Sodium Oral Tablet 10 MG] 90 tablet 0     Sig: TAKE ONE TABLET BY MOUTH AT BEDTIME       Leukotriene Inhibitors Protocol Failed - 9/16/2021  1:17 PM        Failed - Asthma control assessment score within normal limits in last 6 months     Please review ACT score.           Failed - Recent (6 mo) or future (30 days) visit within the authorizing provider's specialty     Patient had office visit in the last 6 months or has a visit in the next 30 days with authorizing provider or within the authorizing provider's specialty.  See \"Patient Info\" tab in inbasket, or \"Choose Columns\" in Meds & Orders section of the refill encounter.            Passed - Patient is age 12 or older     If patient is under 16, ok to refill using age based dosing.           Passed - Medication is active on med list             " Xray Wrist 3 Views, Right Other/ambulette

## 2024-06-10 ENCOUNTER — TELEPHONE (OUTPATIENT)
Dept: VASCULAR SURGERY | Facility: CLINIC | Age: 79
End: 2024-06-10
Payer: COMMERCIAL

## 2024-06-10 DIAGNOSIS — I83.813 VARICOSE VEINS OF BILATERAL LOWER EXTREMITIES WITH PAIN: Primary | ICD-10-CM

## 2024-06-10 NOTE — TELEPHONE ENCOUNTER
6/10/2024    Vein Clinic Preoperative Nurse Call    Procedure: Left leg VNUS closure GSV(med nec) 10-20 stab phlebs(med nec)   Date: 6/19/24  Surgeon: Dr. Mancini  Time: 1430   Check in time: 1330    Called and spoke to patient. Informed patient: when to check in (1330) to sign consent, to bring their preop medications in their original bottle with them (1 mg ativan, 0.1mg clonidine). Patient will take the medications after signing the consent to the procedure. Instructed patient to wear loose-fitting comfortable clothing, and bring their compression hose. Ensured patient has a /someone that will be responsible for them the rest of the day. Once procedure is completed, we will keep patient in recovery for 30-45 mins, and call  with aftercare instructions. Informed patient, that if possible, they should sit in the backseat to elevate their leg on the ride home.    Pt needs Thigh High compression hose for procedure. Status of the hose: patient is purchasing from clinic on day of procedure. .    Special instructions: Hold Aspirin 81 mg 3 days prior to procedure (start holding Sun 6/16)    Take Zofran 4 mg 30 minutes prior to arrival  Sending medications for 2nd procedure on 7/10/24 together.    Patient understands if they have any of the following symptoms (fever, cough, shortness of breath, rash), they need to notify us immediately as they may need to cancel their procedure and reschedule for a later date.    Patient is in agreement with all of the above and has no further questions at this time.    Andi Nayak RN  [facility] Vein Clinic

## 2024-06-11 RX ORDER — LORAZEPAM 1 MG/1
TABLET ORAL
Qty: 2 TABLET | Refills: 0 | Status: SHIPPED | OUTPATIENT
Start: 2024-06-11 | End: 2024-07-12

## 2024-06-11 RX ORDER — CLONIDINE HYDROCHLORIDE 0.1 MG/1
TABLET ORAL
Qty: 2 TABLET | Refills: 0 | Status: SHIPPED | OUTPATIENT
Start: 2024-06-11 | End: 2024-07-12

## 2024-06-11 RX ORDER — ONDANSETRON 4 MG/1
TABLET, ORALLY DISINTEGRATING ORAL
Qty: 2 TABLET | Refills: 0 | Status: SHIPPED | OUTPATIENT
Start: 2024-06-11

## 2024-06-19 ENCOUNTER — OFFICE VISIT (OUTPATIENT)
Dept: VASCULAR SURGERY | Facility: CLINIC | Age: 79
End: 2024-06-19
Payer: COMMERCIAL

## 2024-06-19 ENCOUNTER — ALLIED HEALTH/NURSE VISIT (OUTPATIENT)
Dept: VASCULAR SURGERY | Facility: CLINIC | Age: 79
End: 2024-06-19
Payer: COMMERCIAL

## 2024-06-19 VITALS — HEART RATE: 89 BPM | OXYGEN SATURATION: 94 % | DIASTOLIC BLOOD PRESSURE: 76 MMHG | SYSTOLIC BLOOD PRESSURE: 118 MMHG

## 2024-06-19 DIAGNOSIS — I83.813 VARICOSE VEINS OF BILATERAL LOWER EXTREMITIES WITH PAIN: Primary | ICD-10-CM

## 2024-06-19 DIAGNOSIS — I83.893 VARICOSE VEINS OF BOTH LEGS WITH EDEMA: ICD-10-CM

## 2024-06-19 PROCEDURE — 99207 PR NO CHARGE NURSE ONLY: CPT

## 2024-06-19 PROCEDURE — A6533 GC STOCKING THIGHLNGTH 18-30: HCPCS

## 2024-06-19 PROCEDURE — 36475 ENDOVENOUS RF 1ST VEIN: CPT | Mod: LT | Performed by: SPECIALIST

## 2024-06-19 PROCEDURE — 37765 STAB PHLEB VEINS XTR 10-20: CPT | Mod: LT | Performed by: SPECIALIST

## 2024-06-19 NOTE — PROGRESS NOTES
Patient purchased one pair(s) of Black, Thigh High, Open Toe, size 3 compression hose from the clinic today.     Informed patient all compression hose purchases are final.    Milly Celeste RN on 6/19/2024 at 12:55 PM

## 2024-06-19 NOTE — PROGRESS NOTES
[facility] Procedure Note    Violet Robins  June 19, 2024    Violet Robins is a 79 year old year old female. She presents for Vein Procedure  .    /76   Pulse 89   SpO2 94%         6/19/2024     3:10 PM   Flowsheet Data   Procedure Start Time: 15:10   Prep: Chloraprep   Side: Left   Tx Length (cm): LEFT GSV:47   Junction (cm): LEFT GSV: 2.78   RF Cycles: LEFT GSV: 9   RF TX Time (Minutes): LEFT GSV: 2:55   # PHLEB Sites: LEFT LEG: 10   Sedation taken: Yes   Pre Pt. Physical / Cognitive Limitations: WNL   TOTAL Local anesthesia Injected (ml): 5   Max Volume Local Anesthesia (ml): 11   TOTAL Tumescent Injected volume (ml): 225   Max Volume Tumescent (ml): 572   Post Pt. Physical / Cognitive Limitations: WNL   Procedure End Time: 15:42   D/C Instructions given, states readiness to leave and escorted to car: Yes       Venus Closure    Date/Time: 6/19/2024 3:47 PM    Performed by: Rodrick Mancini MD  Authorized by: Rodrick Mancini MD    Time out: Immediately prior to the procedure a time out was called    Preparation: Patient was prepped and draped in usual sterile fashion    1st Assist:  Camille Leon CST/DANIEL    Circulator:  Milly Celeste RN    Procedure:  VNUS  Procedure side:  Left  One Vein    Vein Treated:  GSV  Patient tolerance:  Patient tolerated the procedure well with no immediate complications  Wrap/Hose:  Wraps  Phlebectomy    Date/Time: 6/19/2024 3:47 PM    Performed by: Rodrick Mancini MD  Authorized by: Rodrick Mancini MD    Time out: Immediately prior to the procedure a time out was called    Preparation: Patient was prepped and draped in usual sterile fashion    1st Assist:  Camille Leon CST/DANIEL    Circulator:  Milly Celeste RN    Procedure:  Phlebectomies  Type:  Medically Necessary  Procedure side:  Left  Stabs:  10-20  Wrap/Hose:  Wraps    Details procedure:  With the cooperation the patient in the preop holding of the left lower extremity was marked as well  as the areas for stab phlebectomy.  She was then taken the procedure room and the left lower extremity was prepped and draped in sterile fashion.  Timeout was performed confirm the identity of the patient as well as the procedure performed.  The greater saphenous vein was then mapped over its entire length and an access point was chosen in the mid calf below the takeoff of all the varicosities.  The vein was accessed with a micropuncture needle and a 6 Sami sheath.  The catheter was then passed up the vein and positioned 2.78 cm in the saphenofemoral junction.  Tumescent solution was placed around the vein and radiofrequency ablation was carried out in 7 cm segments.  The previously marked areas for stab phlebectomy were then infiltrated the local anesthetic.  After adequate analgesia was achieved multiple stabs were made using ophthalmic blade and the veins were removed with a combination of sienna hook and mosquito clamps.  This was done for a total of 10 stabs.  Sterile dressings were applied and the patient taken from the procedure back to holding area in stable condition to be sent home.      Rodrick Mancini MD, FACS

## 2024-06-19 NOTE — LETTER
6/19/2024      Violet Robins  [address]  [address]  [facility] [address]      Dear Colleague,    Thank you for referring your patient, Violet Robins, to the [facility]IN [facility]IN[facility] [place]. Please see a copy of my visit note below.    Pre-procedure Nursing Note    Violet Robins presents to clinic for Vein Procedure  .   /Person Responsible for Patient: Wesley (Spouse)  Phone Number: [phone]    Prophylactic Medication:Anticoagulant/asa, Zofran 4mg,   Time Taken: 1200   Sedation Medication: Ativan, 1mg ,   Time Taken: 1247 and Clonidine, 0.1mg,   Time Taken: 1247  Compression Stockings: Patient purchased from clinic today.  The procedure is being performed on LLE.  Patient understanding of procedure matches consent? YES    Patient's pre-procedure medications verified by AF.    Milly Celeste RN on 6/19/2024 at 12:53 PM        [facility] Procedure Note    Violet Robins  June 19, 2024    Violet Robins is a 79 year old year old female. She presents for Vein Procedure  .    /76   Pulse 89   SpO2 94%         6/19/2024     3:10 PM   Flowsheet Data   Procedure Start Time: 15:10   Prep: Chloraprep   Side: Left   Tx Length (cm): LEFT GSV:47   Junction (cm): LEFT GSV: 2.78   RF Cycles: LEFT GSV: 9   RF TX Time (Minutes): LEFT GSV: 2:55   # PHLEB Sites: LEFT LEG: 10   Sedation taken: Yes   Pre Pt. Physical / Cognitive Limitations: WNL   TOTAL Local anesthesia Injected (ml): 5   Max Volume Local Anesthesia (ml): 11   TOTAL Tumescent Injected volume (ml): 225   Max Volume Tumescent (ml): 572   Post Pt. Physical / Cognitive Limitations: WNL   Procedure End Time: 15:42   D/C Instructions given, states readiness to leave and escorted to car: Yes       Venus Closure    Date/Time: 6/19/2024 3:47 PM    Performed by: Rodrick Mancini MD  Authorized by: Rodrick Mancini MD    Time out: Immediately prior to the procedure a time out was called    Preparation: Patient was  prepped and draped in usual sterile fashion    1st Assist:  Camille Leon CST/KASHA    Circulator:  Milly Celeste RN    Procedure:  VNUS  Procedure side:  Left  One Vein    Vein Treated:  GSV  Patient tolerance:  Patient tolerated the procedure well with no immediate complications  Wrap/Hose:  Wraps  Phlebectomy    Date/Time: 6/19/2024 3:47 PM    Performed by: Rodrick Mancini MD  Authorized by: Rodrick Mancini MD    Time out: Immediately prior to the procedure a time out was called    Preparation: Patient was prepped and draped in usual sterile fashion    1st Assist:  Camille Leon CST/KASHA    Circulator:  Milly Celeste RN    Procedure:  Phlebectomies  Type:  Medically Necessary  Procedure side:  Left  Stabs:  10-20  Wrap/Hose:  Wraps    Details procedure:  With the cooperation the patient in the preop holding of the left lower extremity was marked as well as the areas for stab phlebectomy.  She was then taken the procedure room and the left lower extremity was prepped and draped in sterile fashion.  Timeout was performed confirm the identity of the patient as well as the procedure performed.  The greater saphenous vein was then mapped over its entire length and an access point was chosen in the mid calf below the takeoff of all the varicosities.  The vein was accessed with a micropuncture needle and a 6 Bermudian sheath.  The catheter was then passed up the vein and positioned 2.78 cm in the saphenofemoral junction.  Tumescent solution was placed around the vein and radiofrequency ablation was carried out in 7 cm segments.  The previously marked areas for stab phlebectomy were then infiltrated the local anesthetic.  After adequate analgesia was achieved multiple stabs were made using ophthalmic blade and the veins were removed with a combination of sienna hook and mosquito clamps.  This was done for a total of 10 stabs.  Sterile dressings were applied and the patient taken from the procedure back to  holding area in stable condition to be sent home.      Rodrick Mancini MD, FACS      Again, thank you for allowing me to participate in the care of your patient.        Sincerely,        Rodrick Mancini MD

## 2024-06-19 NOTE — PROGRESS NOTES
Pre-procedure Nursing Note    Violet Robins presents to clinic for Vein Procedure  .   /Person Responsible for Patient: Wesley (Spouse)  Phone Number: [phone]    Prophylactic Medication:Anticoagulant/asa, Zofran 4mg,   Time Taken: 1200   Sedation Medication: Ativan, 1mg ,   Time Taken: 1247 and Clonidine, 0.1mg,   Time Taken: 1247  Compression Stockings: Patient purchased from clinic today.  The procedure is being performed on LLE.  Patient understanding of procedure matches consent? YES    Patient's pre-procedure medications verified by AF.    Milly Celeste RN on 6/19/2024 at 12:53 PM

## 2024-06-21 ENCOUNTER — ANCILLARY PROCEDURE (OUTPATIENT)
Dept: ULTRASOUND IMAGING | Facility: CLINIC | Age: 79
End: 2024-06-21
Payer: COMMERCIAL

## 2024-06-21 ENCOUNTER — ALLIED HEALTH/NURSE VISIT (OUTPATIENT)
Dept: VASCULAR SURGERY | Facility: CLINIC | Age: 79
End: 2024-06-21
Payer: COMMERCIAL

## 2024-06-21 DIAGNOSIS — Z09 POSTOP CHECK: Primary | ICD-10-CM

## 2024-06-21 DIAGNOSIS — I83.12 VARICOSE VEINS OF LEFT LOWER EXTREMITY WITH INFLAMMATION: ICD-10-CM

## 2024-06-21 PROCEDURE — 99207 PR NO CHARGE NURSE ONLY: CPT

## 2024-06-21 PROCEDURE — 93971 EXTREMITY STUDY: CPT | Mod: LT | Performed by: SPECIALIST

## 2024-06-21 NOTE — PROGRESS NOTES
June 21, 2024    Vein Clinic Postoperative Nurse Note    Patient is here for her 48 hour postoperative visit.    Procedure: Left leg VNUS closure GSV([facility]) 10-20 stab phlebs([facility])   Procedure Date: 6/19/24  Surgeon: Dr. Mancini    Ultrasound Result: left lower extremity negative for DVT. Left GSV is closed 13.4mm from SFJ to proximal calf.     Physical Exam: Incisions are approximated without signs of infection.  Ecchymosis: moderate bruising  Swelling: none noted  Paresthesia: patient denies    Patient Questions or Concerns: patient appears to be doing well. No questions or concerns.     Demonstrated and helped patient don compression hose.     Reviewed postoperative instructions with patient and provided her with written material of common things to expect from her procedure.    Patient's Next Vein Clinic Appointment: 2nd procedure scheduled for 7/10/24.    Milly Celeste RN  [facility] Vein Clinic

## 2024-07-10 ENCOUNTER — OFFICE VISIT (OUTPATIENT)
Dept: VASCULAR SURGERY | Facility: CLINIC | Age: 79
End: 2024-07-10
Payer: COMMERCIAL

## 2024-07-10 VITALS — HEART RATE: 90 BPM | SYSTOLIC BLOOD PRESSURE: 118 MMHG | OXYGEN SATURATION: 94 % | DIASTOLIC BLOOD PRESSURE: 75 MMHG

## 2024-07-10 DIAGNOSIS — I83.813 VARICOSE VEINS OF BILATERAL LOWER EXTREMITIES WITH PAIN: Primary | ICD-10-CM

## 2024-07-10 DIAGNOSIS — I83.893 VARICOSE VEINS OF BOTH LEGS WITH EDEMA: ICD-10-CM

## 2024-07-10 PROCEDURE — 36475 ENDOVENOUS RF 1ST VEIN: CPT | Mod: RT | Performed by: SPECIALIST

## 2024-07-10 NOTE — PROGRESS NOTES
Pre-procedure Nursing Note    Violet Robins presents to clinic for Vein Procedure  .   /Person Responsible for Patient: Wesley (Spouse)  Phone Number: [phone]    Prophylactic Medication:Anti-Nausea, 4mg,   Time Taken: 1130   Sedation Medication: Ativan, 1mg ,   Time Taken: 1200 and Clonidine, 0.1mg,   Time Taken: 1200  Compression Stockings: Patient brought with today.  The procedure is being performed on RLE.  Patient understanding of procedure matches consent? YES    Patient's pre-procedure medications verified by AF.    Milly Celeste RN on 7/10/2024 at 11:52 AM

## 2024-07-10 NOTE — PROGRESS NOTES
[facility] Procedure Note    Violet Robins  July 10, 2024    Violet Robins is a 79 year old year old female. She presents for Vein Procedure  .    /75   Pulse 90   SpO2 94%         7/10/2024     1:21 PM   Flowsheet Data   Procedure Start Time: 13:21   Prep: Chloraprep   Side: Right   Tx Length (cm): RIGHT GSV:42.5   Junction (cm): RIGHT GSV: 2.11   RF Cycles: RIGHT GSV: 11   RF TX Time (Minutes): RIGHT GSV: 3:26   Sedation taken: Yes   Pre Pt. Physical / Cognitive Limitations: WNL   TOTAL Local anesthesia Injected (ml): 5.5   Max Volume Local Anesthesia (ml): 11   TOTAL Tumescent Injected volume (ml): 186   Max Volume Tumescent (ml): 286   Post Pt. Physical / Cognitive Limitations: WNL   Procedure End Time: 13:44   D/C Instructions given, states readiness to leave and escorted to car: Yes       Venus Closure    Date/Time: 7/10/2024 1:47 PM    Performed by: Rodrick Mancini MD  Authorized by: Rodrick Mancini MD    1st Assist:  Camille Leon, CST/CSFA    Circulator:  Milly Celeste RN    Procedure:  VNUS  Procedure side:  Right  One Vein    Vein Treated:  GSV  Wrap/Hose:  Wraps    Details of procedure:  With the cooperation of the patient in the preoperative holding area the right lower extremity was marked.  She was taken to the procedure room and the right lower extremity was prepped and draped in sterile fashion.  Timeout was performed confirming the identity of the patient as well as the procedure be performed.  The greater saphenous vein was mapped over its entire length of the mid calf.  An access point was chosen in the proximal calf.  The vein was accessed with a micropuncture needle and 6 Maltese sheath.  The catheter was then passed up the vein and positioned 2.11 cm in the saphenofemoral junction.  Tumescent solution was placed around the vein and radiofrequency ablation was carried out in 7 cm segments.  The vein was reinspected and found to be thickened with no evidence of  thrombus extending into the deep system at the SFJ.  Catheter and sheath were removed.  Sterile dressing was applied.  Patient was then taken from the procedure room back to the holding area in stable condition to be sent home.    Rodrick Mancini MD, FACS

## 2024-07-10 NOTE — LETTER
7/10/2024      Violet Robins  [address]  [address]  [facility] [address]      Dear Colleague,    Thank you for referring your patient, Violet Robins, to the [facility] [place]. Please see a copy of my visit note below.    Pre-procedure Nursing Note    Violet Robins presents to clinic for Vein Procedure  .   /Person Responsible for Patient: Wesley (Spouse)  Phone Number: [phone]    Prophylactic Medication:Anti-Nausea, 4mg,   Time Taken: 1130   Sedation Medication: Ativan, 1mg ,   Time Taken: 1200 and Clonidine, 0.1mg,   Time Taken: 1200  Compression Stockings: Patient brought with today.  The procedure is being performed on E.  Patient understanding of procedure matches consent? YES    Patient's pre-procedure medications verified by AF.    Milly Celeste RN on 7/10/2024 at 11:52 AM        [facility] Procedure Note    Violet Robins  July 10, 2024    Violet Robins is a 79 year old year old female. She presents for Vein Procedure  .    /75   Pulse 90   SpO2 94%         7/10/2024     1:21 PM   Flowsheet Data   Procedure Start Time: 13:21   Prep: Chloraprep   Side: Right   Tx Length (cm): RIGHT GSV:42.5   Junction (cm): RIGHT GSV: 2.11   RF Cycles: RIGHT GSV: 11   RF TX Time (Minutes): RIGHT GSV: 3:26   Sedation taken: Yes   Pre Pt. Physical / Cognitive Limitations: WNL   TOTAL Local anesthesia Injected (ml): 5.5   Max Volume Local Anesthesia (ml): 11   TOTAL Tumescent Injected volume (ml): 186   Max Volume Tumescent (ml): 286   Post Pt. Physical / Cognitive Limitations: WNL   Procedure End Time: 13:44   D/C Instructions given, states readiness to leave and escorted to car: Yes       Venus Closure    Date/Time: 7/10/2024 1:47 PM    Performed by: Rodrick Mancini MD  Authorized by: Rodrick Mancini MD    1st Assist:  Camille Leon, CST/CSFA    Circulator:  Milly Celeste RN    Procedure:  VNUS  Procedure side:  Right  One Vein    Vein  Treated:  GSV  Wrap/Hose:  Wraps    Details of procedure:  With the cooperation of the patient in the preoperative holding area the right lower extremity was marked.  She was taken to the procedure room and the right lower extremity was prepped and draped in sterile fashion.  Timeout was performed confirming the identity of the patient as well as the procedure be performed.  The greater saphenous vein was mapped over its entire length of the mid calf.  An access point was chosen in the proximal calf.  The vein was accessed with a micropuncture needle and 6 [place] citizen sheath.  The catheter was then passed up the vein and positioned 2.11 cm in the saphenofemoral junction.  Tumescent solution was placed around the vein and radiofrequency ablation was carried out in 7 cm segments.  The vein was reinspected and found to be thickened with no evidence of thrombus extending into the deep system at the SFJ.  Catheter and sheath were removed.  Sterile dressing was applied.  Patient was then taken from the procedure room back to the holding area in stable condition to be sent home.    Rodrick Mancini MD, FACS      Again, thank you for allowing me to participate in the care of your patient.        Sincerely,        Rodrick Mancini MD

## 2024-07-11 DIAGNOSIS — I83.893 SYMPTOMATIC VARICOSE VEINS OF BOTH LOWER EXTREMITIES: Primary | ICD-10-CM

## 2024-07-12 ENCOUNTER — ANCILLARY PROCEDURE (OUTPATIENT)
Dept: ULTRASOUND IMAGING | Facility: CLINIC | Age: 79
End: 2024-07-12
Payer: COMMERCIAL

## 2024-07-12 ENCOUNTER — ALLIED HEALTH/NURSE VISIT (OUTPATIENT)
Dept: VASCULAR SURGERY | Facility: CLINIC | Age: 79
End: 2024-07-12
Payer: COMMERCIAL

## 2024-07-12 DIAGNOSIS — Z09 POSTOP CHECK: Primary | ICD-10-CM

## 2024-07-12 DIAGNOSIS — I83.893 SYMPTOMATIC VARICOSE VEINS OF BOTH LOWER EXTREMITIES: ICD-10-CM

## 2024-07-12 PROCEDURE — 99207 PR NO CHARGE NURSE ONLY: CPT

## 2024-07-12 PROCEDURE — 93971 EXTREMITY STUDY: CPT | Mod: RT | Performed by: SPECIALIST

## 2024-07-12 NOTE — PROGRESS NOTES
July 12, 2024    Vein Clinic Postoperative Nurse Note    Patient is here for her 48 hour postoperative visit.    Procedure: Right leg VNUS closure GSV(med nec)   Procedure Date: 7/10/24  Surgeon: Dr. Jimenez    Ultrasound Result: Right lower extremity negative for DVT. Right GSV is closed 4.1 mm from SFJ to proximal calf.     Physical Exam: Incisions are approximated without signs of infection.  Ecchymosis: mild bruising noted at insertion site  Swelling: none  Paresthesia: patient denies    Patient Questions or Concerns: none at this time.     Helped patient don compression hose    Reviewed postoperative instructions with patient and provided her with written material of common things to expect from her procedure.    Patient's Next Vein Clinic Appointment: 6 week follow up 8/27/24    Milly Celeste RN  [facility]

## 2024-09-03 ENCOUNTER — ALLIED HEALTH/NURSE VISIT (OUTPATIENT)
Dept: VASCULAR SURGERY | Facility: CLINIC | Age: 79
End: 2024-09-03
Payer: COMMERCIAL

## 2024-09-03 ENCOUNTER — OFFICE VISIT (OUTPATIENT)
Dept: VASCULAR SURGERY | Facility: CLINIC | Age: 79
End: 2024-09-03
Payer: COMMERCIAL

## 2024-09-03 DIAGNOSIS — I83.893 VARICOSE VEINS OF BOTH LEGS WITH EDEMA: Primary | ICD-10-CM

## 2024-09-03 DIAGNOSIS — I83.893 SYMPTOMATIC VARICOSE VEINS OF BOTH LOWER EXTREMITIES: Primary | ICD-10-CM

## 2024-09-03 PROCEDURE — 99024 POSTOP FOLLOW-UP VISIT: CPT | Performed by: SPECIALIST

## 2024-09-03 PROCEDURE — A6530 COMPRESSION STOCKING BK18-30: HCPCS

## 2024-09-03 PROCEDURE — 99207 PR NO CHARGE NURSE ONLY: CPT

## 2024-09-03 NOTE — PROGRESS NOTES
Follow-up for bilateral lower extremity GSV RF ablation and stab phlebectomies.      Subjective:  Patient reports legs are feeling much better.  Pain is resolved.  Edema much improved.  Still some residual she stands for long periods.      Objective:  B/P: Data Unavailable, T: Data Unavailable, P: Data Unavailable, R: Data Unavailable  Lower extremities: Trace ankle edema, scattered minimal varicosities.  Some thrombosed varicosities.    Assessment/plan:  This is a an 79-year-old lady status post bilateral GSV RF ablation and stab phlebectomy.  Legs are much improved.  She will continue her activity as tolerated.  Follow-up in 6 months as per protocol.    Rodrick Mancini MD, FACS

## 2024-09-03 NOTE — LETTER
9/3/2024      Violet Robins  [address] [facility]  [address]  [facility] [address]      Dear Colleague,    Thank you for referring your patient, Violet Robins, to the [facility] [place]. Please see a copy of my visit note below.    Follow-up for bilateral lower extremity GSV RF ablation and stab phlebectomies.      Subjective:  Patient reports legs are feeling much better.  Pain is resolved.  Edema much improved.  Still some residual she stands for long periods.      Objective:  B/P: Data Unavailable, T: Data Unavailable, P: Data Unavailable, R: Data Unavailable  Lower extremities: Trace ankle edema, scattered minimal varicosities.  Some thrombosed varicosities.    Assessment/plan:  This is a an 79-year-old lady status post bilateral GSV RF ablation and stab phlebectomy.  Legs are much improved.  She will continue her activity as tolerated.  Follow-up in 6 months as per protocol.    Rodrick Mancini MD, FACS        Again, thank you for allowing me to participate in the care of your patient.        Sincerely,        Rodrick Mancini MD

## 2024-09-03 NOTE — PROGRESS NOTES
Patient purchased 3 pair(s) of knee highs: 1 Beige open toe, 1 black closed toe, 1 black open toe, Size 3 compression hose from the clinic today.     Informed patient all compression hose purchases are final.    Andi Nayak RN on 9/3/2024 at 10:11 AM

## 2024-09-30 ENCOUNTER — ANCILLARY PROCEDURE (OUTPATIENT)
Dept: GENERAL RADIOLOGY | Facility: CLINIC | Age: 79
End: 2024-09-30
Attending: PHYSICIAN ASSISTANT
Payer: COMMERCIAL

## 2024-09-30 ENCOUNTER — OFFICE VISIT (OUTPATIENT)
Dept: FAMILY MEDICINE | Facility: CLINIC | Age: 79
End: 2024-09-30
Payer: COMMERCIAL

## 2024-09-30 VITALS
HEIGHT: 60 IN | TEMPERATURE: 97.5 F | OXYGEN SATURATION: 97 % | RESPIRATION RATE: 18 BRPM | DIASTOLIC BLOOD PRESSURE: 70 MMHG | BODY MASS INDEX: 32 KG/M2 | SYSTOLIC BLOOD PRESSURE: 114 MMHG | WEIGHT: 163 LBS | HEART RATE: 93 BPM

## 2024-09-30 DIAGNOSIS — R05.1 ACUTE COUGH: ICD-10-CM

## 2024-09-30 DIAGNOSIS — R05.1 ACUTE COUGH: Primary | ICD-10-CM

## 2024-09-30 PROCEDURE — 99213 OFFICE O/P EST LOW 20 MIN: CPT | Performed by: PHYSICIAN ASSISTANT

## 2024-09-30 PROCEDURE — 71046 X-RAY EXAM CHEST 2 VIEWS: CPT | Mod: TC | Performed by: RADIOLOGY

## 2024-09-30 RX ORDER — DOXYCYCLINE 100 MG/1
100 CAPSULE ORAL 2 TIMES DAILY
Qty: 10 CAPSULE | Refills: 0 | Status: SHIPPED | OUTPATIENT
Start: 2024-09-30 | End: 2024-10-05

## 2024-09-30 RX ORDER — ALBUTEROL SULFATE 90 UG/1
1-2 AEROSOL, METERED RESPIRATORY (INHALATION) EVERY 4 HOURS PRN
Qty: 6.7 G | Refills: 0 | Status: SHIPPED | OUTPATIENT
Start: 2024-09-30

## 2024-09-30 ASSESSMENT — ASTHMA QUESTIONNAIRES
ACT_TOTALSCORE: 18
QUESTION_2 LAST FOUR WEEKS HOW OFTEN HAVE YOU HAD SHORTNESS OF BREATH: ONCE OR TWICE A WEEK
ACT_TOTALSCORE: 18
QUESTION_1 LAST FOUR WEEKS HOW MUCH OF THE TIME DID YOUR ASTHMA KEEP YOU FROM GETTING AS MUCH DONE AT WORK, SCHOOL OR AT HOME: MOST OF THE TIME
QUESTION_3 LAST FOUR WEEKS HOW OFTEN DID YOUR ASTHMA SYMPTOMS (WHEEZING, COUGHING, SHORTNESS OF BREATH, CHEST TIGHTNESS OR PAIN) WAKE YOU UP AT NIGHT OR EARLIER THAN USUAL IN THE MORNING: ONCE A WEEK
QUESTION_5 LAST FOUR WEEKS HOW WOULD YOU RATE YOUR ASTHMA CONTROL: WELL CONTROLLED
QUESTION_4 LAST FOUR WEEKS HOW OFTEN HAVE YOU USED YOUR RESCUE INHALER OR NEBULIZER MEDICATION (SUCH AS ALBUTEROL): NOT AT ALL

## 2024-09-30 ASSESSMENT — PAIN SCALES - GENERAL: PAINLEVEL: NO PAIN (0)

## 2024-09-30 ASSESSMENT — ENCOUNTER SYMPTOMS: COUGH: 1

## 2024-09-30 NOTE — PROGRESS NOTES
Assessment & Plan   Problem List Items Addressed This Visit    None  Visit Diagnoses       Acute cough    -  Primary    Relevant Medications    albuterol (PROAIR HFA/PROVENTIL HFA/VENTOLIN HFA) 108 (90 Base) MCG/ACT inhaler    Other Relevant Orders    XR Chest 2 Views (Completed)           Impression is likely viral URI vs atypical CAP. Appears well and non-toxic and I have low suspicion for impending airway obstruction or respiratory distress at this point. CXR shows no pneumonia, pneumothorax, pleural effusion, edema, or other worrisome process. She will push p.o. fluids, use over-the-counter meds for symptoms, complete a course of doxycycline if not improving in the next 3-5 days for coverage of atypical CAP given duration of symptoms, albuterol inhaler and follow-up with us in 1-2 weeks if not improving or urgent care/the ER if symptoms worsen/change at any time.    Complete history and physical exam as below. Afebrile with normal vital signs.    DDx and Dx discussed with and explained to the pt to their satisfaction.  All questions were answered at this time. Pt expressed understanding of and agreement with this dx, tx, and plan. No further workup warranted and standard medication warnings given. I have given the patient a list of pertinent indications for re-evaluation. Will go to the Emergency Department if symptoms worsen or new concerning symptoms arise. Patient left in no apparent distress.      BMI  Estimated body mass index is 31.83 kg/m  as calculated from the following:    Height as of this encounter: 1.524 m (5').    Weight as of this encounter: 73.9 kg (163 lb).     See Patient Instructions      Subjective   Alayna is a 79 year old, presenting for the following health issues:  Cough        9/30/2024     1:34 PM   Additional Questions   Roomed by Isadora Yost CMA   Accompanied by N/A         9/30/2024     1:34 PM   Patient Reported Additional Medications   Patient reports taking the following new  medications No new medications     Cough    History of Present Illness       Reason for visit:  Cough  Symptom onset:  3-7 days ago  Symptoms include:  Cough  Symptom intensity:  Moderate  Symptom progression:  Staying the same  Had these symptoms before:  Yes  Has tried/received treatment for these symptoms:  Yes  Previous treatment was successful:  Yes  Prior treatment description:  Antibiotic  What makes it worse:  Smoke  What makes it better:  Tea water   She is taking medications regularly.     Patient is coming in today for a productive cough (yellowish phlegm) and headaches.  Patient has had no fever and no known sick exposure.  She did take a home test covid that was negative. No sob, chest pain, fever or other symptoms.    Review of Systems  Constitutional, HEENT, cardiovascular, pulmonary, gi and gu systems are negative, except as otherwise noted.      Objective    /70   Pulse 93   Temp 97.5  F (36.4  C) (Temporal)   Resp 18   Ht 1.524 m (5')   Wt 73.9 kg (163 lb)   SpO2 97%   BMI 31.83 kg/m    Body mass index is 31.83 kg/m .  Physical Exam  Vitals and nursing note reviewed.   Constitutional:       General: She is not in acute distress.     Appearance: She is not ill-appearing or diaphoretic.   HENT:      Head: Normocephalic and atraumatic.      Mouth/Throat:      Mouth: Mucous membranes are moist.      Pharynx: Oropharynx is clear.   Eyes:      Conjunctiva/sclera: Conjunctivae normal.   Cardiovascular:      Rate and Rhythm: Normal rate and regular rhythm.      Heart sounds: Normal heart sounds. No murmur heard.     No friction rub. No gallop.      Comments: 2+ symmetric radial/PT pulses. No LE edema or tenderness.  Pulmonary:      Effort: Pulmonary effort is normal. No respiratory distress.      Breath sounds: Normal breath sounds. No stridor. No wheezing, rhonchi or rales.   Musculoskeletal:      Cervical back: Neck supple. No rigidity.   Lymphadenopathy:      Cervical: No cervical  adenopathy.   Skin:     General: Skin is warm and dry.   Neurological:      General: No focal deficit present.      Mental Status: She is alert. Mental status is at baseline.   Psychiatric:         Mood and Affect: Mood normal.         Behavior: Behavior normal.          Results for orders placed or performed in visit on 09/30/24   XR Chest 2 Views     Status: None    Narrative    CHEST TWO VIEWS 9/30/2024 2:29 PM     HISTORY: Acute cough    COMPARISON: None.       Impression    IMPRESSION: There are no acute infiltrates. The cardiac silhouette is  not enlarged. Pulmonary vasculature is unremarkable.    MULUGETA SEVILLA MD         SYSTEM ID:  [id number]           Signed Electronically by: ALEXANDER Santos

## 2024-09-30 NOTE — PATIENT INSTRUCTIONS
José Miguel Catalan,    Thank you for allowing [facility] to manage your care.    This is likely a viral respiratory illness and your exam is reassuring. We will see what our workup shows.     If you develop worsening/changing symptoms at any time, please be seen in clinic/urgent care or call 911/go to the emergency department for evaluation as we discussed.    I ordered some xrays. Please go to our radiology department to get your xrays.    I sent your prescriptions to your pharmacy. Start the doxycycline in 3 days if you are not improving. Take a probiotic pill, eat live culture yogurt (Greek yogurt or Activia), drink kefir daily for one week if you have to take the antibiotics to encourage growth of good bacteria in your system.    Please allow 1-2 business days for our office to contact you in regards to your laboratory/radiological studies.  If not done so, I encourage you to login into [email or web address] to review your results as well.     If you have any questions or concerns, please feel free to call us at [phone]    Sincerely,    Rick Guillermo PA-C    Did you know?      You can schedule a video visit for follow-up appointments as well as future appointments for certain conditions.  Please see the below link.     [email or web address]    If you have not already done so,  I encourage you to sign up for [employer] ([email or web address]).  This will allow you to review your results, securely communicate with a provider, and schedule virtual visits as well.

## 2024-11-08 ENCOUNTER — ANCILLARY PROCEDURE (OUTPATIENT)
Dept: GENERAL RADIOLOGY | Facility: CLINIC | Age: 79
End: 2024-11-08
Payer: COMMERCIAL

## 2024-11-08 ENCOUNTER — OFFICE VISIT (OUTPATIENT)
Dept: FAMILY MEDICINE | Facility: CLINIC | Age: 79
End: 2024-11-08
Payer: COMMERCIAL

## 2024-11-08 VITALS
OXYGEN SATURATION: 94 % | TEMPERATURE: 98.1 F | BODY MASS INDEX: 29.25 KG/M2 | DIASTOLIC BLOOD PRESSURE: 72 MMHG | RESPIRATION RATE: 24 BRPM | SYSTOLIC BLOOD PRESSURE: 122 MMHG | HEIGHT: 60 IN | WEIGHT: 149 LBS | HEART RATE: 74 BPM

## 2024-11-08 DIAGNOSIS — I10 BENIGN ESSENTIAL HYPERTENSION: ICD-10-CM

## 2024-11-08 DIAGNOSIS — J45.21 MILD INTERMITTENT EXTRINSIC ASTHMA WITH ACUTE EXACERBATION: Primary | ICD-10-CM

## 2024-11-08 DIAGNOSIS — R05.8 PRODUCTIVE COUGH: ICD-10-CM

## 2024-11-08 PROCEDURE — 99214 OFFICE O/P EST MOD 30 MIN: CPT

## 2024-11-08 PROCEDURE — 71046 X-RAY EXAM CHEST 2 VIEWS: CPT | Mod: TC | Performed by: RADIOLOGY

## 2024-11-08 RX ORDER — PREDNISONE 20 MG/1
40 TABLET ORAL DAILY
Qty: 10 TABLET | Refills: 0 | Status: SHIPPED | OUTPATIENT
Start: 2024-11-08 | End: 2024-11-13

## 2024-11-08 RX ORDER — FLUTICASONE PROPIONATE AND SALMETEROL 100; 50 UG/1; UG/1
1 POWDER RESPIRATORY (INHALATION) EVERY 12 HOURS
Qty: 60 EACH | Refills: 0 | Status: SHIPPED | OUTPATIENT
Start: 2024-11-08

## 2024-11-08 ASSESSMENT — PAIN SCALES - GENERAL: PAINLEVEL_OUTOF10: NO PAIN (0)

## 2024-11-08 ASSESSMENT — ASTHMA QUESTIONNAIRES
QUESTION_2 LAST FOUR WEEKS HOW OFTEN HAVE YOU HAD SHORTNESS OF BREATH: MORE THAN ONCE A DAY
QUESTION_4 LAST FOUR WEEKS HOW OFTEN HAVE YOU USED YOUR RESCUE INHALER OR NEBULIZER MEDICATION (SUCH AS ALBUTEROL): THREE OR MORE TIMES PER DAY
ACT_TOTALSCORE: 11
QUESTION_5 LAST FOUR WEEKS HOW WOULD YOU RATE YOUR ASTHMA CONTROL: SOMEWHAT CONTROLLED
QUESTION_3 LAST FOUR WEEKS HOW OFTEN DID YOUR ASTHMA SYMPTOMS (WHEEZING, COUGHING, SHORTNESS OF BREATH, CHEST TIGHTNESS OR PAIN) WAKE YOU UP AT NIGHT OR EARLIER THAN USUAL IN THE MORNING: FOUR OR MORE NIGHTS A WEEK
QUESTION_1 LAST FOUR WEEKS HOW MUCH OF THE TIME DID YOUR ASTHMA KEEP YOU FROM GETTING AS MUCH DONE AT WORK, SCHOOL OR AT HOME: NONE OF THE TIME
ACT_TOTALSCORE: 11

## 2024-11-08 ASSESSMENT — ENCOUNTER SYMPTOMS: COUGH: 1

## 2024-11-08 NOTE — LETTER
My Asthma Action Plan    Name: Violet Robins   YOB: 1945  Date: 11/8/2024   My doctor: BERTA Ko CNP   My clinic: [facility]        My Rescue Medicine:   Albuterol inhaler (Proair/Ventolin/Proventil HFA)  2-4 puffs EVERY 4 HOURS as needed. Use a spacer if recommended by your provider.   My Asthma Severity:   Intermittent / Exercise Induced  Know your asthma triggers: smoke and strong odors and fumes  pollens          GREEN ZONE   Good Control  I feel good  No cough or wheeze  Can work, sleep and play without asthma symptoms       Take your asthma control medicine every day.     If exercise triggers your asthma, take your rescue medication  15 minutes before exercise or sports, and  During exercise if you have asthma symptoms  Spacer to use with inhaler: If you have a spacer, make sure to use it with your inhaler             YELLOW ZONE Getting Worse  I have ANY of these:  I do not feel good  Cough or wheeze  Chest feels tight  Wake up at night   Keep taking your Green Zone medications  Start taking your rescue medicine:  every 20 minutes for up to 1 hour. Then every 4 hours for 24-48 hours.  If you stay in the Yellow Zone for more than 12-24 hours, contact your doctor.  If you do not return to the Green Zone in 12-24 hours or you get worse, start taking your oral steroid medicine if prescribed by your provider.           RED ZONE Medical Alert - Get Help  I have ANY of these:  I feel awful  Medicine is not helping  Breathing getting harder  Trouble walking or talking  Nose opens wide to breathe       Take your rescue medicine NOW  If your provider has prescribed an oral steroid medicine, start taking it NOW  Call your doctor NOW  If you are still in the Red Zone after 20 minutes and you have not reached your doctor:  Take your rescue medicine again and  Call 911 or go to the emergency room right away    See your regular doctor within 2 weeks of an Emergency Room or  Urgent Care visit for follow-up treatment.          Annual Reminders:  Meet with Asthma Educator,  Flu Shot in the Fall, consider Pneumonia Vaccination for patients with asthma (aged 19 and older).    Pharmacy:    [id number] - [place], [place] - [address] [place] [address] [id number] - [place], [place] - [address]    Electronically signed by BERTA Ko CNP   Date: 11/08/24                    Asthma Triggers  How To Control Things That Make Your Asthma Worse    Triggers are things that make your asthma worse.  Look at the list below to help you find your triggers and   what you can do about them. You can help prevent asthma flare-ups by staying away from your triggers.      Trigger                                                          What you can do   Cigarette Smoke  Tobacco smoke can make asthma worse. Do not allow smoking in your home, car or around you.  Be sure no one smokes at a child s day care or school.  If you smoke, ask your health care provider for ways to help you quit.  Ask family members to quit too.  Ask your health care provider for a referral to Quit Plan to help you quit smoking, or call 4-023-059-PLAN.     Colds, Flu, Bronchitis  These are common triggers of asthma. Wash your hands often.  Don t touch your eyes, nose or mouth.  Get a flu shot every year.     Dust Mites  These are tiny bugs that live in cloth or carpet. They are too small to see. Wash sheets and blankets in hot water every week.   Encase pillows and mattress in dust mite proof covers.  Avoid having carpet if you can. If you have carpet, vacuum weekly.   Use a dust mask and HEPA vacuum.   Pollen and Outdoor Mold  Some people are allergic to trees, grass, or weed pollen, or molds. Try to keep your windows closed.  Limit time out doors when pollen count is high.   Ask you health care provider about taking medicine during allergy season.     Animal Dander  Some people are allergic to skin flakes, urine or saliva  from pets with fur or feathers. Keep pets with fur or feathers out of your home.    If you can t keep the pet outdoors, then keep the pet out of your bedroom.  Keep the bedroom door closed.  Keep pets off cloth furniture and away from stuffed toys.     Mice, Rats, and Cockroaches  Some people are allergic to the waste from these pests.   Cover food and garbage.  Clean up spills and food crumbs.  Store grease in the refrigerator.   Keep food out of the bedroom.   Indoor Mold  This can be a trigger if your home has high moisture. Fix leaking faucets, pipes, or other sources of water.   Clean moldy surfaces.  Dehumidify basement if it is damp and smelly.   Smoke, Strong Odors, and Sprays  These can reduce air quality. Stay away from strong odors and sprays, such as perfume, powder, hair spray, paints, smoke incense, paint, cleaning products, candles and new carpet.   Exercise or Sports  Some people with asthma have this trigger. Be active!  Ask your doctor about taking medicine before sports or exercise to prevent symptoms.    Warm up for 5-10 minutes before and after sports or exercise.     Other Triggers of Asthma  Cold air:  Cover your nose and mouth with a scarf.  Sometimes laughing or crying can be a trigger.  Some medicines and food can trigger asthma.

## 2024-11-08 NOTE — PATIENT INSTRUCTIONS
"I am highly suspicious that your ongoing cough is related to an asthma exacerbation opposed to viral/bacterial infection. Today we completed a chest x-ray to evaluate for pneumonia. If there are abnormal findings, I will call you.     I have prescribed oral steroid called prednisone to aid in reducing airway inflammation. Please take 40 mg in the morning for the next 5 days. Take with food to prevent stomach upset. I have also prescribed a daily asthma inhaler with an inhaled steroid and long acting bronchodilator called Advair. Please take 1 puff every 12 hours for the next 3 weeks. OK to reduce to 1 puff daily based on symptoms and discontinue if cough symptoms have improved. Goal is to decrease the use of the as needed albuterol inhaler. Please rinse your mouth out well following use to prevent oral yeast infection called thrush.     For the postnasal drip and runny nose I would recommend using a topical nasal steroid spray.  Common brands of this are Flonase.  I have included directions on use below.    - Nasal Corticosteroids: I recommend using a nasal corticosteroid spray, such as Flonase or Nasacort, to reduce inflammation in your nasal passages and improve breathing and drainage.  Use the nasal spray once daily (1-2 sprays into each nostril).   It is best to do a saline rinse just prior to using the nasal spray.  Shake and \"prime\" the bottle first making sure a nice spray comes out prior to use.  Aim back into the sinuses, not just straight up your nose. Also aim a little away from the center (septum) of your nose.   Breath in slightly (but not excessively) with each spray. When completed breath out through the mouth  Repeat on the other side.  Wipe of the nozzle with a clean tissue when complete and cover with the manufactures cap.    Store in a room temperature area out of the light.  Don't share sprays with friends and family.    A side effect of most nasal sprays includes nose bleeds.  Be patient as it " may take a few days to start working.

## 2024-11-08 NOTE — PROGRESS NOTES
Assessment & Plan     Mild intermittent extrinsic asthma with acute exacerbation  Patient is a 79 year-old female with hypertension, DDD, and allergic asthma presenting with concerns of ongoing productive cough. ACT 11 today and expiratory wheezes appreciated throughout lung fields, concerns for acute asthma exacerbation given these symptoms. Prescribed 5 day course of 40 mg prednisone to further reduce airway inflammation. Also prescribed daily ICS-LABA for additional asthma management. Asthma action plan completed today. Discussed proper use of medication(s) and potential side effects. Follow-up if symptoms fail to improve despite above interventions. Patient understands and is agreeable to plan as discussed in clinic.  - predniSONE (DELTASONE) 20 MG tablet; Take 2 tablets (40 mg) by mouth daily for 5 days.  - fluticasone-salmeterol (ADVAIR) 100-50 MCG/ACT inhaler; Inhale 1 puff into the lungs every 12 hours.  - Asthma Action Plan (AAP)    Productive cough  Plan for CXR to ensure no evidence of pneumonia.   - XR Chest 2 Views; Future    Benign essential hypertension  Stable during encounter. Continue with prescribed medication regimen.       Subjective   Alayna is a 79 year old, presenting for the following health issues:  Cough        11/8/2024     2:32 PM   Additional Questions   Roomed by VE     History of Present Illness     Asthma:  She presents for follow up of asthma.  She has some cough, some wheezing, and no shortness of breath.  She is using a relief medication every 4 hours. She does not miss any doses of her controller medication throughout the week. Patient is aware of the following triggers: cold air, occupational exposure and pollens. The patient has not had a visit to the Emergency Room, Urgent Care or Hospital due to asthma since the last clinic visit.     Reason for visit:  Cough    She eats 2-3 servings of fruits and vegetables daily.She consumes 1 sweetened beverage(s) daily.She exercises with  enough effort to increase her heart rate 10 to 19 minutes per day.  She exercises with enough effort to increase her heart rate 4 days per week.   She is taking medications regularly.       Acute Illness  Acute illness concerns: cough  Onset/Duration: 1 month  Symptoms:  Fever: No  Chills/Sweats: No  Headache (location?): No  Sinus Pressure: YES  Conjunctivitis:  No  Ear Pain: YES- plugged  Rhinorrhea: YES  Congestion: YES  Sore Throat: No  Cough: YES-productive of yellow sputum, productive of green sputum  Wheeze: YES  Decreased Appetite: YES  Nausea: No  Vomiting: No  Diarrhea: No  Dysuria/Freq.: No  Dysuria or Hematuria: No  Fatigue/Achiness: No  Sick/Strep Exposure: started with smoke exposure  Therapies tried and outcome: mucinex, inhaler, cough drops    Presents with concerns of ongoing cough for over a month. Was seen on 9/30/24 for same symptoms and was prescribed a 5 day course of BID doxycycline and PRN albuterol. Did not notice any improvement in cough  symptoms while taking the doxycycline of following completion. Since 9/30/234 has been using the albuterol inhaler every 4 hours but this is not super helpful with symptoms. Cough is productive with yellow to green sputum. Has attempted to use Mucinex and cough drops without improvement.     History of asthma, which has historically been intermittent with known triggers of smoke and perfumes.         Objective    /72 (BP Location: Left arm, Patient Position: Chair, Cuff Size: Adult Regular)   Pulse 74   Temp 98.1  F (36.7  C) (Temporal)   Resp 24   Ht 1.524 m (5')   Wt 67.6 kg (149 lb)   LMP  (LMP Unknown)   SpO2 94%   Breastfeeding No   BMI 29.10 kg/m    Body mass index is 29.1 kg/m .  Physical Exam  Vitals reviewed.   Constitutional:       General: She is not in acute distress.     Appearance: Normal appearance. She is not ill-appearing.   HENT:      Head: Normocephalic and atraumatic.      Right Ear: Tympanic membrane, ear canal and  external ear normal.      Left Ear: Tympanic membrane, ear canal and external ear normal.      Ears:      Comments: Negative for middle ear effusion, TM injection, bulging, and erythema bilaterally.     Nose: No congestion or rhinorrhea.      Mouth/Throat:      Mouth: Mucous membranes are moist.      Pharynx: Oropharynx is clear. Postnasal drip present. No oropharyngeal exudate or posterior oropharyngeal erythema.   Eyes:      Conjunctiva/sclera: Conjunctivae normal.      Right eye: Right conjunctiva is not injected. No exudate.     Left eye: Left conjunctiva is not injected. No exudate.     Pupils: Pupils are equal, round, and reactive to light.   Cardiovascular:      Rate and Rhythm: Normal rate and regular rhythm.      Heart sounds: Normal heart sounds, S1 normal and S2 normal. No murmur heard.  Pulmonary:      Effort: Pulmonary effort is normal. No tachypnea or respiratory distress.      Breath sounds: Wheezing (Expiratory wheezes appreciated throughout lung fields.) present.   Lymphadenopathy:      Cervical: No cervical adenopathy.   Skin:     General: Skin is warm and dry.      Capillary Refill: Capillary refill takes less than 2 seconds.      Findings: No lesion or rash.   Neurological:      Mental Status: She is alert.   Psychiatric:         Attention and Perception: Attention and perception normal.         Mood and Affect: Mood and affect normal.        XR pending formal read by radiologist.           Signed Electronically by: BERTA Ko CNP

## 2024-11-15 ENCOUNTER — TELEPHONE (OUTPATIENT)
Dept: FAMILY MEDICINE | Facility: OTHER | Age: 79
End: 2024-11-15
Payer: COMMERCIAL

## 2024-11-15 NOTE — TELEPHONE ENCOUNTER
General Call    Contacts       Contact Date/Time Type Contact Phone/Fax    11/15/2024 03:09 PM CST Phone (Incoming) Alayna Robins (Self) [phone] (M)          Reason for Call:  Prescription without Visit     What are your questions or concerns:  Patient completed all medications prescribed to her from her last visit. However, both patient's ears are plugged now. Patient feels unbalanced and uncomfortable. Patient would like to see if she can get a prescription for her plugged ears without having to coming into the office for an appointment.    Date of last appointment with provider: 11/08/2024    Okay to leave a detailed message?: Yes at Cell number on file:    Telephone Information:   Mobile [phone]

## 2024-11-18 ENCOUNTER — NURSE TRIAGE (OUTPATIENT)
Dept: FAMILY MEDICINE | Facility: CLINIC | Age: 79
End: 2024-11-18
Payer: COMMERCIAL

## 2024-11-18 NOTE — TELEPHONE ENCOUNTER
RN Triage    Patient Contact    Attempt # 1    Was call answered?  No. Left message on voicemail with information to call clinic and ask to speak to a member of the care team.    Mariella Hazel RN on 11/18/2024 at 11:42 AM

## 2024-11-18 NOTE — TELEPHONE ENCOUNTER
"S-(situation): Patient is calling regarding some ear congestion that is causing her not to hear very well.  She stated it feels very full.    B-(background): Patient was treated on 11/8/2024 for mild intermittent asthma and cough.  She stated her ears were not bothering her at the time of that visit.    A-(assessment): Patient may have ear infection/ wax in ears/ fluid behind ear drum.    R-(recommendations): Per RN protocol, advised patient to schedule office visit within 3 days.  Patient stated she would like to schedule a visit for tomorrow.  Offered patient open visit at her home [facility] [place] in [place].  Scheduled patient informing her to arrive to the clinic 20 minutes prior to check in at the .  Patient stated understanding.    Reason for Disposition   Ear congestion present > 48 hours    Additional Information   Negative: Ear pain is main symptom   Negative: Hearing loss (complete or partial) is main symptom   Negative: Earwax is main concern   Negative: Has nasal allergies and they are acting up   Negative: Earache lasts > 1 hour   Negative: Pus or cloudy discharge from ear canal   Negative: Patient wants to be seen    Answer Assessment - Initial Assessment Questions  1. LOCATION: \"Which ear is involved?\"        Both ears, worse in left ear    2. SENSATION: \"Describe how the ear feels.\" (e.g. stuffy, full, plugged).\"       Full and plugged can not hear very well    3. ONSET:  \"When did the ear symptoms start?\"        Middle of last week, now getting worse    4. PAIN: \"Do you also have an earache?\" If Yes, ask: \"How bad is it?\" (Scale 1-10; or mild, moderate, severe)      No pain    5. CAUSE: \"What do you think is causing the ear congestion?\"      Was treated for asthma on 11/8/2024    6. URI: \"Do you have a runny nose or cough?\"       No    7. NASAL ALLERGIES: \"Are there symptoms of hay fever, such as sneezing or a clear nasal discharge?\"      Little nasal dischage    8. PREGNANCY: \"Is there " "any chance you are pregnant?\" \"When was your last menstrual period?\"      NA    Protocols used: Ear - Congestion-A-[place]  Lisa Tucker RN    "

## 2024-11-19 ENCOUNTER — OFFICE VISIT (OUTPATIENT)
Dept: FAMILY MEDICINE | Facility: CLINIC | Age: 79
End: 2024-11-19
Payer: COMMERCIAL

## 2024-11-19 VITALS
HEIGHT: 60 IN | BODY MASS INDEX: 29.25 KG/M2 | RESPIRATION RATE: 16 BRPM | WEIGHT: 149 LBS | DIASTOLIC BLOOD PRESSURE: 76 MMHG | SYSTOLIC BLOOD PRESSURE: 110 MMHG | HEART RATE: 100 BPM | OXYGEN SATURATION: 97 % | TEMPERATURE: 99 F

## 2024-11-19 DIAGNOSIS — H66.003 NON-RECURRENT ACUTE SUPPURATIVE OTITIS MEDIA OF BOTH EARS WITHOUT SPONTANEOUS RUPTURE OF TYMPANIC MEMBRANES: Primary | ICD-10-CM

## 2024-11-19 PROCEDURE — 99213 OFFICE O/P EST LOW 20 MIN: CPT | Performed by: NURSE PRACTITIONER

## 2024-11-19 ASSESSMENT — PAIN SCALES - GENERAL: PAINLEVEL_OUTOF10: NO PAIN (0)

## 2024-11-19 NOTE — PROGRESS NOTES
Assessment & Plan     Non-recurrent acute suppurative otitis media of both ears without spontaneous rupture of tympanic membranes  Treat with:  - amoxicillin-clavulanate (AUGMENTIN) 875-125 MG tablet; Take 1 tablet by mouth 2 times daily for 10 days.    Follow up if no improvement in 2 weeks.    The risks, benefits and treatment options of prescribed medications or other treatments have been discussed with the patient. The patient verbalized their understanding and should call or follow up if no improvement or if they develop further problems.  Courtney Garfield, CNP              Subjective   Alayna is a 79 year old, presenting for the following health issues:  Ear Problem (Patient reports ears are plugged since viral illness for the last week.  Denies pain;  having a hard time hearing.)        11/19/2024     8:44 AM   Additional Questions   Roomed by Lisette MORENO CMA   Accompanied by self         11/19/2024     8:44 AM   Patient Reported Additional Medications   Patient reports taking the following new medications none     HPI         Chief Complaint   Patient presents with    Ear Problem     Patient reports ears are plugged since viral illness for the last week.  Denies pain;  having a hard time hearing.     Patient reports that she has had a URI for the last 3 weeks that was affecting her asthma.  Received steroids and albuterol - helpful.  Now her ears feel plugged and she can't hear well. Left ear worse than right.              Review of Systems  Constitutional, HEENT, cardiovascular, pulmonary, gi and gu systems are negative, except as otherwise noted.      Objective    /76 (BP Location: Right arm, Patient Position: Sitting, Cuff Size: Adult Regular)   Pulse 100   Temp 99  F (37.2  C) (Tympanic)   Resp 16   Ht 1.524 m (5')   Wt 67.6 kg (149 lb)   LMP  (LMP Unknown)   SpO2 97%   BMI 29.10 kg/m    Body mass index is 29.1 kg/m .  Physical Exam   GENERAL: alert and no distress  HENT: both ears:  erythematous and mucopurulent effusion            Signed Electronically by: BERTA Jung CNP

## 2024-12-02 ENCOUNTER — OFFICE VISIT (OUTPATIENT)
Dept: FAMILY MEDICINE | Facility: CLINIC | Age: 79
End: 2024-12-02
Payer: COMMERCIAL

## 2024-12-02 VITALS
DIASTOLIC BLOOD PRESSURE: 76 MMHG | TEMPERATURE: 98.5 F | HEART RATE: 86 BPM | BODY MASS INDEX: 30.22 KG/M2 | OXYGEN SATURATION: 98 % | WEIGHT: 153.9 LBS | SYSTOLIC BLOOD PRESSURE: 122 MMHG | HEIGHT: 60 IN | RESPIRATION RATE: 24 BRPM

## 2024-12-02 DIAGNOSIS — H65.93 OME (OTITIS MEDIA WITH EFFUSION), BILATERAL: Primary | ICD-10-CM

## 2024-12-02 PROCEDURE — 99213 OFFICE O/P EST LOW 20 MIN: CPT | Performed by: NURSE PRACTITIONER

## 2024-12-02 ASSESSMENT — PAIN SCALES - GENERAL: PAINLEVEL_OUTOF10: NO PAIN (0)

## 2024-12-02 NOTE — PROGRESS NOTES
Assessment & Plan     OME (otitis media with effusion), bilateral  Patient just completed antibiotics for bilateral AOM.  Clear effusion remains.  Discussed with patient that the infection has been treated. The effusion should resolve over the next 2-4 weeks.  If hearing does not improve over the next 2-4 weeks, she should be seen by audiology and ENT.      The risks, benefits and treatment options of prescribed medications or other treatments have been discussed with the patient. The patient verbalized their understanding and should call or follow up if no improvement or if they develop further problems.  Courtney Garfield, CNP              Subjective   Alayna is a 79 year old, presenting for the following health issues:  Ear Problem (Can't hear out of ears )        12/2/2024     1:44 PM   Additional Questions   Roomed by Luiz VILLAFUERTE CMA   Accompanied by self     Ear Problem    History of Present Illness       Reason for visit:  Plugged Ears   She is taking medications regularly.         Concern - Plugged Ears   Onset: x 3 weeks   Description: bilateral plugged ears, can not hear well   Was treated for bilateral ear infection from 11/19 to 11/29. Off antibiotics for 2 days.  Intensity: moderate, severe  Progression of Symptoms:  pain is resolved, but decreased hearing remains unchanged.  Accompanying Signs & Symptoms: none   Previous history of similar problem: yes , she thinks she may have ruptured her left ear drum in the past   Precipitating factors:        Worsened by: none   Alleviating factors:        Improved by: none   Therapies tried and outcome: otc ear dops in right ear - no relief         Review of Systems  Constitutional, HEENT, cardiovascular, pulmonary, gi and gu systems are negative, except as otherwise noted.      Objective    /76 (BP Location: Right arm, Patient Position: Sitting, Cuff Size: Adult Regular)   Pulse 86   Temp 98.5  F (36.9  C) (Tympanic)   Resp 24   Ht 1.524 m (5')   Wt 69.8  kg (153 lb 14.4 oz)   LMP  (LMP Unknown)   SpO2 98%   BMI 30.06 kg/m    Body mass index is 30.06 kg/m .  Physical Exam   GENERAL: alert and no distress  HENT: both ears: clear effusion behind both TMs. Canals normal. TM are no longer erythematous or bulging.            Signed Electronically by: BERTA Jung CNP

## 2024-12-02 NOTE — PROGRESS NOTES
Chief concern (CC): Difficulty hearing.       History of Present Illness: Patient is a pleasant 79 year old female who presents to the clinic with decreased hearing in both ears following a bilateral Otitis media with effusion. She was seen on 11/19/24 for the OME, and was treated with amoxicillin-clavulanate (AUGMENTIN) 875-125 MG tablet for 10 days. She completed the course on 11/29/24.     Today she states that the OME symptoms have improved but she still can't hear very well in both ears, and feels a generalized facial and ear fullness. She denies any ear pain or discharges. She notices some dizziness now and then but not all the time. Denied any nausea. No family history of hearing loss.        Past Medical History  Past Medical History:   Diagnosis Date    Breast cancer (H) 2009    right     Patient Active Problem List   Diagnosis    CA in situ breast    DDD (degenerative disc disease), cervical    Routine general medical examination at a health care facility    CARDIOVASCULAR SCREENING; LDL GOAL LESS THAN 130    Essential hypertension    Allergic asthma with stated cause    Chronic pain of both shoulders     Medications  Current Outpatient Medications   Medication Sig Dispense Refill    albuterol (PROAIR HFA/PROVENTIL HFA/VENTOLIN HFA) 108 (90 Base) MCG/ACT inhaler Inhale 1-2 puffs into the lungs every 4 hours as needed. 6.7 g 0    amLODIPine (NORVASC) 5 MG tablet Take 1 tablet (5 mg) by mouth daily 90 tablet 3    ascorbic acid 500 MG TABS Take 1-2 mg by mouth every 24 hours      aspirin (ASA) 81 MG tablet Take 1 tablet by mouth every 24 hours      Calcium Carbonate-Vitamin D (CALCIUM + D PO) Take  by mouth.      Cholecalciferol (VITAMIN D) 1000 UNITS capsule Take 1 capsule by mouth daily      Fexofenadine HCl (MUCINEX ALLERGY PO) Take by mouth 2 times daily      fluticasone-salmeterol (ADVAIR) 100-50 MCG/ACT inhaler Inhale 1 puff into the lungs every 12 hours. 60 each 0    Glucosamine-Chondroit-Vit C-Mn  (GLUCOSAMINE 1500 COMPLEX) CAPS Take 1 capsule by mouth daily       losartan (COZAAR) 25 MG tablet Take 1 tablet (25 mg) by mouth daily 90 tablet 3    metroNIDAZOLE (METROGEL) 0.75 % external gel APPLY TO AFFECTED AREA(S) TWICE DAILY 45 g 5    montelukast (SINGULAIR) 10 MG tablet TAKE ONE TABLET BY MOUTH AT BEDTIME 90 tablet 3    Multiple Vitamin (MULTIVITAMIN OR) Take 1 tablet by mouth daily       ondansetron (ZOFRAN ODT) 4 MG ODT tab Take 1 tablet (4 mg) 30 minutes prior to arrival time for procedure for nausea 2 tablet 0    VITAMIN E Take 1 capsule by mouth daily       No current facility-administered medications for this visit.        Allergies & reactions:     Allergies   Allergen Reactions    Bee Venom Hives and Swelling     PN: swelling and then went to cellulitis    Oxycontin [Oxycodone Hcl] Nausea and Vomiting    Codeine Nausea and Vomiting    Latex Other (See Comments)     PN: Converted from LW Latex Sensitivity Flag    Lisinopril Cough    Tramadol Nausea and Vomiting        Alcohol, drugs, smoking: Non-smoker. Does not drink.       Review of Systems   Constitutional: Negative for unintentional weight loss, fever, chills, fatigue, or night-sweats.  HEENT: Positive for decreased hearing. Negative for vision changes, headache, congestion, sore throat or ear pain.  CV: Negative for chest pain, palpitations or edema.  Resp: Negative for shortness of breath, wheezing, or cough.  GI: Negative for nausea, vomiting, constipation, diarrhea, blood in stool, or abdominal pain.  : Negative for urinary frequency, urgency, hesitancy, or hematuria.  MSK: Negative for muscle weakness, pain, movement difficulties, or joint stiffness.  Skin, hair nails: Denies any rash, wounds, lesions, flaking or tenderness.  Neuro: Negative for weakness, confusion, numbness or dizziness.  Psych: Denies any depressive or suicidal thoughts.        Objective    Vitals: /76 (BP Location: Right arm, Patient Position: Sitting, Cuff  Size: Adult Regular)   Pulse 86   Temp 98.5  F (36.9  C) (Tympanic)   Resp 24   Ht 1.524 m (5')   Wt 69.8 kg (153 lb 14.4 oz)   LMP  (LMP Unknown)   SpO2 98%   BMI 30.06 kg/m       Physical exam    General: Alert, calm, well-appearing, in no apparent acute distress.  HEENT:   Eyes: Conjunctiva clear, no erythema, sclera non-icteric, PERRLA. Extraocular movements intact. No exudates or hemorrhages.   Ears: TMs intact, translucent & mobile. Clear effusions present behind TM in both ears. Ear canals clean, no discharge or exudate. Decreased hearing in both ears.  Nose: No sinus tenderness to palpation or percussion. No external lesions, mucosa non-inflamed, septum and turbinates normal.  Throat: Mucosa non-inflamed, no tonsillar hypertrophy or exudate.   Neck & lymph: No masses or adenopathy, thyroid non-enlarged, non-tender to palpation. Respiratory: Breath sounds clear to auscultation bilaterally without wheezes, crackles or rhonchi.  Cardiovascular: Heart rhythm and rate regular. No peripheral edema.  Neurological: Cranial nerves II-XII grossly intact.       Assessment & Plan    #Decreased hearing subsequent to Otitis media with effusion.  -Decreased hearing is likely due to residual effusion from the infection.  -Patient educated on the timeline for effusion after OME. Even after the infection has resolved, it still takes several weeks for the effusion to resolve. Once the effusion is cleared, patient should see an improvement in hearing.  -No intervention necessary at this time. Plan to continue monitoring.  -If no improvement in hearing in the next 2-4 weeks, patient should be seen by audiology and ENT.  -Patient verbalized understanding and agrees with plan of care.          Signed: LEONIE Villanueva student

## 2024-12-27 ENCOUNTER — ANCILLARY ORDERS (OUTPATIENT)
Dept: MAMMOGRAPHY | Facility: CLINIC | Age: 79
End: 2024-12-27

## 2024-12-27 DIAGNOSIS — Z12.31 VISIT FOR SCREENING MAMMOGRAM: Primary | ICD-10-CM

## 2024-12-31 DIAGNOSIS — R05.1 ACUTE COUGH: ICD-10-CM

## 2024-12-31 RX ORDER — ALBUTEROL SULFATE 90 UG/1
1-2 INHALANT RESPIRATORY (INHALATION) EVERY 4 HOURS PRN
Qty: 6.7 G | Refills: 0 | Status: SHIPPED | OUTPATIENT
Start: 2024-12-31

## 2025-01-04 ENCOUNTER — HOSPITAL ENCOUNTER (OUTPATIENT)
Dept: MAMMOGRAPHY | Facility: CLINIC | Age: 80
Discharge: HOME | End: 2025-01-04
Attending: NURSE PRACTITIONER | Admitting: NURSE PRACTITIONER
Payer: MEDICARE

## 2025-01-04 DIAGNOSIS — Z12.31 VISIT FOR SCREENING MAMMOGRAM: ICD-10-CM

## 2025-01-04 PROCEDURE — 77063 BREAST TOMOSYNTHESIS BI: CPT

## 2025-01-07 DIAGNOSIS — I10 BENIGN ESSENTIAL HYPERTENSION: ICD-10-CM

## 2025-01-07 RX ORDER — LOSARTAN POTASSIUM 25 MG/1
25 TABLET ORAL DAILY
Qty: 90 TABLET | Refills: 3 | Status: SHIPPED | OUTPATIENT
Start: 2025-01-07

## 2025-01-27 DIAGNOSIS — R05.1 ACUTE COUGH: ICD-10-CM

## 2025-01-27 RX ORDER — ALBUTEROL SULFATE 90 UG/1
1-2 INHALANT RESPIRATORY (INHALATION) EVERY 4 HOURS PRN
Qty: 6.7 G | Refills: 0 | Status: SHIPPED | OUTPATIENT
Start: 2025-01-27

## 2025-01-27 NOTE — TELEPHONE ENCOUNTER
Last sent by same day provider for an acute cough.     Should patient continue medication?    Thank you,  Courtney Zaragoza RN

## 2025-02-24 DIAGNOSIS — R05.1 ACUTE COUGH: ICD-10-CM

## 2025-02-25 RX ORDER — ALBUTEROL SULFATE 90 UG/1
INHALANT RESPIRATORY (INHALATION)
Qty: 6.7 G | Refills: 2 | Status: SHIPPED | OUTPATIENT
Start: 2025-02-25

## 2025-03-04 ENCOUNTER — OFFICE VISIT (OUTPATIENT)
Dept: VASCULAR SURGERY | Facility: CLINIC | Age: 80
End: 2025-03-04
Attending: SPECIALIST
Payer: COMMERCIAL

## 2025-03-04 ENCOUNTER — ANCILLARY PROCEDURE (OUTPATIENT)
Dept: ULTRASOUND IMAGING | Facility: CLINIC | Age: 80
End: 2025-03-04
Attending: SPECIALIST
Payer: COMMERCIAL

## 2025-03-04 DIAGNOSIS — I83.893 VARICOSE VEINS OF BOTH LEGS WITH EDEMA: ICD-10-CM

## 2025-03-04 DIAGNOSIS — I83.813 VARICOSE VEINS OF BILATERAL LOWER EXTREMITIES WITH PAIN: Primary | ICD-10-CM

## 2025-03-04 PROCEDURE — 99212 OFFICE O/P EST SF 10 MIN: CPT | Performed by: SPECIALIST

## 2025-03-04 PROCEDURE — 93970 EXTREMITY STUDY: CPT | Performed by: SURGERY

## 2025-03-04 NOTE — LETTER
3/4/2025      Violet Robins  [address]  [address]  [place] [address]      Dear Colleague,    Thank you for referring your patient, Violet Robins, to the [facility] [place]. Please see a copy of my visit note below.    6-month follow-up for bilateral lower extremity GSV RF ablation and stab phlebectomies    Subjective:  Patient feels much better.  Her cramps and pain remain resolved.  She still has some swelling in the left leg but that is overall much improved.  She is happy with result.  She continues to wear compression socks.    Objective:  B/P: Data Unavailable, T: Data Unavailable, P: Data Unavailable, R: Data Unavailable  Bilateral scattered varicosities.  Some trace ankle edema on the left.      Ultrasound preliminary: Right GSV is closed 8.9 mm from SFJ to proximal calf.  Left GSV is closed 18 mm from SFJ to proximal calf.    Assessment/plan:  Patient is status post bilateral GSV RF ablation with stab phlebectomy.  She feels her legs are overall much improved.  She will continue activity as tolerated and compression socks as needed.  She will follow-up with us as necessary.    Rodrick Mancini MD, FACS      Again, thank you for allowing me to participate in the care of your patient.        Sincerely,        Rodrick Mancini MD    Electronically signed

## 2025-03-04 NOTE — PROGRESS NOTES
6-month follow-up for bilateral lower extremity GSV RF ablation and stab phlebectomies    Subjective:  Patient feels much better.  Her cramps and pain remain resolved.  She still has some swelling in the left leg but that is overall much improved.  She is happy with result.  She continues to wear compression socks.    Objective:  B/P: Data Unavailable, T: Data Unavailable, P: Data Unavailable, R: Data Unavailable  Bilateral scattered varicosities.  Some trace ankle edema on the left.      Ultrasound preliminary: Right GSV is closed 8.9 mm from SFJ to proximal calf.  Left GSV is closed 18 mm from SFJ to proximal calf.    Assessment/plan:  Patient is status post bilateral GSV RF ablation with stab phlebectomy.  She feels her legs are overall much improved.  She will continue activity as tolerated and compression socks as needed.  She will follow-up with us as necessary.    Rodrick Mancini MD, FACS

## 2025-04-02 DIAGNOSIS — J45.20 MILD INTERMITTENT EXTRINSIC ASTHMA WITHOUT COMPLICATION: ICD-10-CM

## 2025-04-02 RX ORDER — MONTELUKAST SODIUM 10 MG/1
TABLET ORAL
Qty: 90 TABLET | Refills: 0 | Status: SHIPPED | OUTPATIENT
Start: 2025-04-02

## 2025-04-02 NOTE — TELEPHONE ENCOUNTER
Requested Prescriptions   Pending Prescriptions Disp Refills    montelukast (SINGULAIR) 10 MG tablet [Pharmacy Med Name: Montelukast Sodium Oral Tablet 10 MG] 90 tablet 0     Sig: TAKE ONE TABLET BY MOUTH AT BEDTIME       Leukotriene Inhibitors Protocol Failed - 4/2/2025  1:06 PM        Failed - Asthma control assessment score within normal limits in last 6 months     Please review ACT score.          2/9/2024     7:52 AM 9/30/2024     1:39 PM 11/8/2024     2:35 PM   ACT Total Scores   ACT TOTAL SCORE (Goal Greater than or Equal to 20) 24 18 11    In the past 12 months, how many times did you visit the emergency room for your asthma without being admitted to the hospital? 0 0 0   In the past 12 months, how many times were you hospitalized overnight because of your asthma? 0 0 0       Patient-reported          Passed - Patient is age 12 or older     If patient is under 16, ok to refill using age based dosing.           Passed - Medication is active on med list and the sig matches. RN to manually verify dose and sig if red X/fail.     If the protocol passes (green check), you do not need to verify med dose and sig.    A prescription matches if they are the same clinical intention.    For Example: once daily and every morning are the same.    The protocol can not identify upper and lower case letters as matching and will fail.     For Example: Take 1 tablet (50 mg) by mouth daily     TAKE 1 TABLET (50 MG) BY MOUTH DAILY    For all fails (red x), verify dose and sig.    If the refill does match what is on file, the RN can still proceed to approve the refill request.       If they do not match, route to the appropriate provider.             Passed - Recent (6 mo) or future (90 days) visit within the authorizing provider's specialty     The patient must have completed an in-person or virtual visit within the past 6 months or has a future visit scheduled within the next 90 days with the authorizing provider s specialty.   Urgent care and e-visits do not quality as an office visit for this protocol.          Passed - Medication indicated for associated diagnosis     Medication is associated with one or more of the following diagnoses:   Allergies   Asthma   Atopic Dermatitis   Nasal Congestion   Nasal discharge   Rhinitis   Urticaria, chronic   Cystic Fibrosis  Bronchiectasis              Julie Behrendt RN

## 2025-04-30 DIAGNOSIS — L71.9 ROSACEA: ICD-10-CM

## 2025-05-01 RX ORDER — METRONIDAZOLE TOPICAL 7.5 MG/G
GEL TOPICAL
Qty: 45 G | Refills: 2 | Status: SHIPPED | OUTPATIENT
Start: 2025-05-01

## 2025-05-03 DIAGNOSIS — I10 BENIGN ESSENTIAL HYPERTENSION: ICD-10-CM

## 2025-05-05 RX ORDER — AMLODIPINE BESYLATE 5 MG/1
5 TABLET ORAL DAILY
Qty: 90 TABLET | Refills: 0 | Status: SHIPPED | OUTPATIENT
Start: 2025-05-05

## 2025-05-26 DIAGNOSIS — R05.1 ACUTE COUGH: ICD-10-CM

## 2025-05-28 ENCOUNTER — TELEPHONE (OUTPATIENT)
Dept: FAMILY MEDICINE | Facility: CLINIC | Age: 80
End: 2025-05-28
Payer: COMMERCIAL

## 2025-05-28 RX ORDER — ALBUTEROL SULFATE 90 UG/1
INHALANT RESPIRATORY (INHALATION)
Qty: 6.7 G | Refills: 2 | Status: SHIPPED | OUTPATIENT
Start: 2025-05-28

## 2025-05-28 NOTE — LETTER
May 28, 2025    Violet Robins  [address] [facility] [address]    [place] [address]    Hello,     Your care team at [facility] values your health and well-being. After reviewing your chart, we have identified recommendation(s) to help you better manage your health.    It's time for your Medicare AWV. During your visit, we'll discuss your health, well-being, and any questions you may have related to preventive care. We'll also review any recommended tests, exams, or screenings you might need. To schedule please call your clinic [phone] or use your [employer] account.     If you recently had or are having any of these services soon, please contact the clinic via phone or [employer] so that your care team can update your records.    We look forward to seeing you at your upcoming visit.    If you have any questions or concerns, please contact our clinic. Thank you for continuing to trust us with your care.    Sincerely,    Your [facility] Care Team

## 2025-05-28 NOTE — TELEPHONE ENCOUNTER
Patient Quality Outreach    Patient is due for the following:   Physical Annual Wellness Visit      Topic Date Due    COVID-19 Vaccine (9 - 2024-25 season) 05/18/2025       Action(s) Taken:   Schedule a Annual Wellness Visit, immunizations    Type of outreach:    Sent letter.    Questions for provider review:    None         Ora Petersen  Chart routed to None.

## 2025-06-24 DIAGNOSIS — J45.20 MILD INTERMITTENT EXTRINSIC ASTHMA WITHOUT COMPLICATION: ICD-10-CM

## 2025-06-24 RX ORDER — MONTELUKAST SODIUM 10 MG/1
1 TABLET ORAL
Qty: 90 TABLET | Refills: 0 | Status: SHIPPED | OUTPATIENT
Start: 2025-06-24

## 2025-07-09 ENCOUNTER — OFFICE VISIT (OUTPATIENT)
Dept: FAMILY MEDICINE | Facility: CLINIC | Age: 80
End: 2025-07-09
Payer: COMMERCIAL

## 2025-07-09 VITALS
BODY MASS INDEX: 29.55 KG/M2 | TEMPERATURE: 98.3 F | SYSTOLIC BLOOD PRESSURE: 156 MMHG | HEIGHT: 60 IN | RESPIRATION RATE: 18 BRPM | WEIGHT: 150.5 LBS | HEART RATE: 79 BPM | OXYGEN SATURATION: 94 % | DIASTOLIC BLOOD PRESSURE: 92 MMHG

## 2025-07-09 DIAGNOSIS — L02.91 ABSCESS: Primary | ICD-10-CM

## 2025-07-09 DIAGNOSIS — I10 ESSENTIAL HYPERTENSION: ICD-10-CM

## 2025-07-09 DIAGNOSIS — J45.50 SEVERE PERSISTENT ASTHMA WITHOUT COMPLICATION (H): ICD-10-CM

## 2025-07-09 PROCEDURE — 3080F DIAST BP >= 90 MM HG: CPT | Performed by: STUDENT IN AN ORGANIZED HEALTH CARE EDUCATION/TRAINING PROGRAM

## 2025-07-09 PROCEDURE — 99214 OFFICE O/P EST MOD 30 MIN: CPT | Mod: 25 | Performed by: STUDENT IN AN ORGANIZED HEALTH CARE EDUCATION/TRAINING PROGRAM

## 2025-07-09 PROCEDURE — 3077F SYST BP >= 140 MM HG: CPT | Performed by: STUDENT IN AN ORGANIZED HEALTH CARE EDUCATION/TRAINING PROGRAM

## 2025-07-09 PROCEDURE — 10061 I&D ABSCESS COMP/MULTIPLE: CPT | Performed by: STUDENT IN AN ORGANIZED HEALTH CARE EDUCATION/TRAINING PROGRAM

## 2025-07-09 PROCEDURE — 1125F AMNT PAIN NOTED PAIN PRSNT: CPT | Performed by: STUDENT IN AN ORGANIZED HEALTH CARE EDUCATION/TRAINING PROGRAM

## 2025-07-09 RX ORDER — CEPHALEXIN 500 MG/1
500 CAPSULE ORAL 2 TIMES DAILY
Qty: 14 CAPSULE | Refills: 0 | Status: SHIPPED | OUTPATIENT
Start: 2025-07-09 | End: 2025-07-16

## 2025-07-09 RX ORDER — SULFAMETHOXAZOLE AND TRIMETHOPRIM 800; 160 MG/1; MG/1
1 TABLET ORAL 2 TIMES DAILY
Qty: 14 TABLET | Refills: 0 | Status: SHIPPED | OUTPATIENT
Start: 2025-07-09 | End: 2025-07-16

## 2025-07-09 RX ORDER — AZELASTINE 1 MG/ML
2 SPRAY, METERED NASAL
COMMUNITY
Start: 2025-01-30

## 2025-07-09 RX ORDER — TRIAMCINOLONE ACETONIDE 55 UG/1
2 SPRAY, METERED NASAL
COMMUNITY
Start: 2025-01-30

## 2025-07-09 ASSESSMENT — ASTHMA QUESTIONNAIRES
QUESTION_3 LAST FOUR WEEKS HOW OFTEN DID YOUR ASTHMA SYMPTOMS (WHEEZING, COUGHING, SHORTNESS OF BREATH, CHEST TIGHTNESS OR PAIN) WAKE YOU UP AT NIGHT OR EARLIER THAN USUAL IN THE MORNING: TWO OR THREE NIGHTS A WEEK
QUESTION_1 LAST FOUR WEEKS HOW MUCH OF THE TIME DID YOUR ASTHMA KEEP YOU FROM GETTING AS MUCH DONE AT WORK, SCHOOL OR AT HOME: SOME OF THE TIME
QUESTION_4 LAST FOUR WEEKS HOW OFTEN HAVE YOU USED YOUR RESCUE INHALER OR NEBULIZER MEDICATION (SUCH AS ALBUTEROL): THREE OR MORE TIMES PER DAY
QUESTION_5 LAST FOUR WEEKS HOW WOULD YOU RATE YOUR ASTHMA CONTROL: SOMEWHAT CONTROLLED
QUESTION_2 LAST FOUR WEEKS HOW OFTEN HAVE YOU HAD SHORTNESS OF BREATH: THREE TO SIX TIMES A WEEK
ACT_TOTALSCORE: 12

## 2025-07-09 ASSESSMENT — PAIN SCALES - GENERAL: PAINLEVEL_OUTOF10: MODERATE PAIN (5)

## 2025-07-09 NOTE — PROGRESS NOTES
"  Assessment & Plan     Abscess  > s/p I&D today, see proc note below   - cephALEXin (KEFLEX) 500 MG capsule; Take 1 capsule (500 mg) by mouth 2 times daily for 7 days.  - sulfamethoxazole-trimethoprim (BACTRIM DS) 800-160 MG tablet; Take 1 tablet by mouth 2 times daily for 7 days.  - patient aware to use tylenol for pain management since she reports NSAIDs cause stomach upset      Essential hypertension  > elevated blood pressure today in clinic could also be secondary to anxiety and pain   - patient is currently on amlodipine     Severe persistent asthma without complication (H)  > patient has albuterol on her medication list, not in any acute respiratory distress    Follow-up plan:   - return to clinic in 48 hours to follow-up on I&D site and assess response to antibiotics       BMI  Estimated body mass index is 29.15 kg/m  as calculated from the following:    Height as of this encounter: 1.53 m (5' 0.25\").    Weight as of this encounter: 68.3 kg (150 lb 8 oz).       Subjective   Alayna is a 80 year old, presenting for the following health issues:  Insect Bites        7/9/2025    12:47 PM   Additional Questions   Roomed by Cindy Evans   Accompanied by self     History of Present Illness       Reason for visit:  Bug bite  Symptom onset:  1-3 days ago  Symptoms include:  Sore bump on right arm  Symptom intensity:  Severe  Symptom progression:  Worsening  Had these symptoms before:  No  What makes it worse:  N/A  What makes it better:  N/A   She is taking medications regularly.          Concern - Bug Bite  Onset: 3 days ago  Description: Large sore bump on right arm with severe redness  Intensity: severe  Progression of Symptoms:  worsening- getting bigger  Accompanying Signs & Symptoms: Draining, redness, sore, bump  Previous history of similar problem: No  Precipitating factors:        Worsened by: N/A  Alleviating factors:        Improved by: N/A  Therapies tried and outcome: Warm and cold wash cloths, Neosporin " "cream      ROS:   As above         Objective    BP (!) 156/92 (BP Location: Right arm, Patient Position: Sitting, Cuff Size: Adult Regular)   Pulse 79   Temp 98.3  F (36.8  C) (Tympanic)   Resp 18   Ht 1.53 m (5' 0.25\")   Wt 68.3 kg (150 lb 8 oz)   LMP  (LMP Unknown)   SpO2 94%   BMI 29.15 kg/m    Body mass index is 29.15 kg/m .  Physical Exam  Constitutional:       General: She is not in acute distress.     Appearance: Normal appearance.   HENT:      Head: Normocephalic and atraumatic.      Right Ear: External ear normal.      Left Ear: External ear normal.   Eyes:      General: No scleral icterus.        Right eye: No discharge.         Left eye: No discharge.      Extraocular Movements: Extraocular movements intact.      Conjunctiva/sclera: Conjunctivae normal.   Pulmonary:      Effort: Pulmonary effort is normal. No respiratory distress.   Musculoskeletal:         General: Normal range of motion.      Cervical back: Normal range of motion and neck supple.   Skin:     General: Skin is warm.      Comments: See image uploaded below for abscess on the posterior aspect of the right forearm   Neurological:      General: No focal deficit present.      Mental Status: She is alert and oriented to person, place, and time.   Psychiatric:         Mood and Affect: Mood normal.         Behavior: Behavior normal.                     PROCEDURE:  Incision and Drainage  Consent obtained  Time out performed  Alcohol prep to area. 10 mL 1% Lidocaine with epinephrine was injected forming a wheel above the fluctuant area.  11blade puncture to abscess.  Purulent and sanguinous drainage, pressure to abscess until no further drainage obtained.  Hemostasis achieved with pressure and gauze.  Packing applied and covered with bandaid.      Signed Electronically by: GWENDOLYN OLSON MD    "

## 2025-07-24 ENCOUNTER — OFFICE VISIT (OUTPATIENT)
Dept: FAMILY MEDICINE | Facility: CLINIC | Age: 80
End: 2025-07-24
Payer: COMMERCIAL

## 2025-07-24 VITALS
WEIGHT: 149 LBS | HEIGHT: 59 IN | OXYGEN SATURATION: 97 % | HEART RATE: 82 BPM | RESPIRATION RATE: 16 BRPM | TEMPERATURE: 97.8 F | DIASTOLIC BLOOD PRESSURE: 72 MMHG | BODY MASS INDEX: 30.04 KG/M2 | SYSTOLIC BLOOD PRESSURE: 120 MMHG

## 2025-07-24 DIAGNOSIS — Z00.00 MEDICARE ANNUAL WELLNESS VISIT, SUBSEQUENT: Primary | ICD-10-CM

## 2025-07-24 PROCEDURE — G0438 PPPS, INITIAL VISIT: HCPCS | Performed by: NURSE PRACTITIONER

## 2025-07-24 PROCEDURE — 3078F DIAST BP <80 MM HG: CPT | Performed by: NURSE PRACTITIONER

## 2025-07-24 PROCEDURE — 3074F SYST BP LT 130 MM HG: CPT | Performed by: NURSE PRACTITIONER

## 2025-07-24 PROCEDURE — 1126F AMNT PAIN NOTED NONE PRSNT: CPT | Performed by: NURSE PRACTITIONER

## 2025-07-24 SDOH — HEALTH STABILITY: PHYSICAL HEALTH: ON AVERAGE, HOW MANY DAYS PER WEEK DO YOU ENGAGE IN MODERATE TO STRENUOUS EXERCISE (LIKE A BRISK WALK)?: 2 DAYS

## 2025-07-24 ASSESSMENT — PAIN SCALES - GENERAL: PAINLEVEL_OUTOF10: NO PAIN (0)

## 2025-07-24 ASSESSMENT — SOCIAL DETERMINANTS OF HEALTH (SDOH): HOW OFTEN DO YOU GET TOGETHER WITH FRIENDS OR RELATIVES?: MORE THAN THREE TIMES A WEEK

## 2025-07-24 NOTE — PROGRESS NOTES
Preventive Care Visit  [facility]  BERTA Troncoso CNP, Family Medicine  Jul 24, 2025      Assessment & Plan     Medicare annual wellness visit, subsequent  -exam normal  -patient would like to see dermatologist for full body skin check.   - Adult Dermatology  Referral; Future      Counseling  Appropriate preventive services were addressed with this patient via screening, questionnaire, or discussion as appropriate for fall prevention, nutrition, physical activity, Tobacco-use cessation, social engagement, weight loss and cognition.  Checklist reviewing preventive services available has been given to the patient.  Reviewed patient's diet, addressing concerns and/or questions.   She is at risk for lack of exercise and has been provided with information to increase physical activity for the benefit of her well-being.   Information on urinary incontinence and treatment options given to patient.   Reviewed preventive health counseling, as reflected in patient instructions       Regular exercise       Healthy diet/nutrition       Vision screening       Osteoporosis prevention/bone health        Subjective   Alayna is a 80 year old, presenting for the following:  Wellness Visit        7/24/2025     9:28 AM   Additional Questions   Roomed by Harika COLLAZO   Accompanied by Self        HPI    Advance Care Planning    Document on file is a Health Care Directive or POLST.        7/24/2025   General Health   How would you rate your overall physical health? Good   Feel stress (tense, anxious, or unable to sleep) Only a little   (!) STRESS CONCERN      7/24/2025   Nutrition   Diet: Low fat/cholesterol         7/24/2025   Exercise   Days per week of moderate/strenous exercise 2 days   (!) EXERCISE CONCERN      7/24/2025   Social Factors   Frequency of gathering with friends or relatives More than three times a week   Worry food won't last until get money to buy more No   Food not last or not  have enough money for food? No   Do you have housing? (Housing is defined as stable permanent housing and does not include staying outside in a car, in a tent, in an abandoned building, in an overnight shelter, or couch-surfing.) Yes   Are you worried about losing your housing? No   Lack of transportation? No   Unable to get utilities (heat,electricity)? No         7/24/2025   Fall Risk   Fallen 2 or more times in the past year? No   Trouble with walking or balance? No          7/24/2025   Activities of Daily Living- Home Safety   Needs help with the following daily activites None of the above   Safety concerns in the home None of the above         7/24/2025   Dental   Dentist two times every year? Yes         7/24/2025   Hearing Screening   Hearing concerns? None of the above         7/24/2025   Driving Risk Screening   Patient/family members have concerns about driving No         7/24/2025   General Alertness/Fatigue Screening   Have you been more tired than usual lately? No         7/24/2025   Urinary Incontinence Screening   Bothered by leaking urine in past 6 months Yes         Today's PHQ-2 Score:       7/24/2025     9:02 AM   PHQ-2 ( 1999 [employer])   Q1: Little interest or pleasure in doing things 0   Q2: Feeling down, depressed or hopeless 0   PHQ-2 Score 0    Q1: Little interest or pleasure in doing things Not at all   Q2: Feeling down, depressed or hopeless Not at all   PHQ-2 Score 0       Patient-reported           7/24/2025   Substance Use   Alcohol more than 3/day or more than 7/wk No   Do you have a current opioid prescription? No   How severe/bad is pain from 1 to 10? 1/10   Do you use any other substances recreationally? No     Social History     Tobacco Use    Smoking status: Never     Passive exposure: Never    Smokeless tobacco: Never   Vaping Use    Vaping status: Never Used   Substance Use Topics    Alcohol use: Not Currently    Drug use: Never           1/4/2025   LAST FHS-7 RESULTS   1st degree  relative breast or ovarian cancer No   Any relative bilateral breast cancer No   Any male have breast cancer No   Any ONE woman have BOTH breast AND ovarian cancer No   Any woman with breast cancer before 50yrs No   2 or more relatives with breast AND/OR ovarian cancer No   2 or more relatives with breast AND/OR bowel cancer No        Mammogram Screening - After age 74- determine frequency with patient based on health status, life expectancy and patient goals    Reviewed and updated as needed this visit by Provider    Allergies  Meds  Problems                 Current providers sharing in care for this patient include:  Patient Care Team:  Komal Lynch APRN CNP as PCP - General (Nurse Practitioner Primary Care)  Komal Lynch APRN CNP as Assigned PCP  Rodrick Mancini MD as Assigned Heart and Vascular Surgical Provider    The following health maintenance items are reviewed in Epic and correct as of today:  Health Maintenance   Topic Date Due    MEDICARE ANNUAL WELLNESS VISIT  02/09/2025    COVID-19 VACCINE (9 - 2024-25 season) 05/18/2025    INFLUENZA VACCINE (1) 09/01/2025    ASTHMA ACTION PLAN  11/08/2025    MAMMO SCREENING  01/04/2026    ASTHMA CONTROL TEST  01/09/2026    BMP  07/11/2026    ANNUAL REVIEW OF HM ORDERS  07/11/2026    FALL RISK ASSESSMENT  07/24/2026    ADVANCE CARE PLANNING  02/20/2028    DTAP/TDAP/TD VACCINE (4 - Td or Tdap) 04/17/2028    DEXA  08/31/2030    PHQ-2 (once per calendar year)  Completed    PNEUMOCOCCAL VACCINE 50+ YEARS  Completed    HPV VACCINE (No Doses Required) Completed    ZOSTER VACCINE  Completed    RSV VACCINE  Completed    MENINGITIS VACCINE  Aged Out         Review of Systems  Constitutional, HEENT, cardiovascular, pulmonary, gi and gu systems are negative, except as otherwise noted.     Objective    Exam  /72 (BP Location: Left arm, Patient Position: Sitting, Cuff Size: Adult Regular)   Pulse 82   Temp 97.8  F (36.6  C) (Tympanic)   Resp 16   " Ht 1.499 m (4' 11\")   Wt 67.6 kg (149 lb)   LMP  (LMP Unknown)   SpO2 97%   BMI 30.09 kg/m     Estimated body mass index is 30.09 kg/m  as calculated from the following:    Height as of this encounter: 1.499 m (4' 11\").    Weight as of this encounter: 67.6 kg (149 lb).    Physical Exam  GENERAL: alert and no distress  EYES: Eyes grossly normal to inspection, PERRL and conjunctivae and sclerae normal  HENT: ear canals and TM's normal, nose and mouth without ulcers or lesions  NECK: no adenopathy, no asymmetry, masses, or scars  RESP: lungs clear to auscultation - no rales, rhonchi or wheezes  CV: regular rate and rhythm, normal S1 S2, no S3 or S4, no murmur, click or rub, no peripheral edema   MS: no gross musculoskeletal defects noted, no edema  SKIN: no suspicious lesions or rashes  NEURO: Normal strength and tone, mentation intact and speech normal  PSYCH: mentation appears normal, affect normal/bright         7/24/2025   Mini Cog   Clock Draw Score 2 Normal   3 Item Recall 3 objects recalled   Mini Cog Total Score 5              Signed Electronically by: BERTA Troncoso CNP    "

## 2025-07-26 DIAGNOSIS — I10 BENIGN ESSENTIAL HYPERTENSION: ICD-10-CM

## 2025-07-28 ENCOUNTER — PATIENT OUTREACH (OUTPATIENT)
Dept: CARE COORDINATION | Facility: CLINIC | Age: 80
End: 2025-07-28
Payer: COMMERCIAL

## 2025-07-28 RX ORDER — AMLODIPINE BESYLATE 5 MG/1
5 TABLET ORAL DAILY
Qty: 90 TABLET | Refills: 2 | Status: SHIPPED | OUTPATIENT
Start: 2025-07-28